# Patient Record
Sex: MALE | Race: BLACK OR AFRICAN AMERICAN | NOT HISPANIC OR LATINO | Employment: FULL TIME | URBAN - METROPOLITAN AREA
[De-identification: names, ages, dates, MRNs, and addresses within clinical notes are randomized per-mention and may not be internally consistent; named-entity substitution may affect disease eponyms.]

---

## 2017-01-18 ENCOUNTER — APPOINTMENT (EMERGENCY)
Dept: RADIOLOGY | Facility: HOSPITAL | Age: 47
End: 2017-01-18

## 2017-01-18 ENCOUNTER — APPOINTMENT (EMERGENCY)
Dept: CT IMAGING | Facility: HOSPITAL | Age: 47
End: 2017-01-18

## 2017-01-18 ENCOUNTER — HOSPITAL ENCOUNTER (EMERGENCY)
Facility: HOSPITAL | Age: 47
Discharge: HOME | End: 2017-01-18
Attending: EMERGENCY MEDICINE | Admitting: EMERGENCY MEDICINE

## 2017-01-18 VITALS
TEMPERATURE: 98.3 F | RESPIRATION RATE: 16 BRPM | HEART RATE: 48 BPM | SYSTOLIC BLOOD PRESSURE: 144 MMHG | WEIGHT: 265 LBS | DIASTOLIC BLOOD PRESSURE: 69 MMHG | OXYGEN SATURATION: 95 %

## 2017-01-18 DIAGNOSIS — I10 HYPERTENSION: Primary | ICD-10-CM

## 2017-01-18 DIAGNOSIS — R51.9 HEADACHE: ICD-10-CM

## 2017-01-18 LAB
AMPHETAMINES SERPL QL SCN: NEGATIVE
ANION GAP SERPL CALCULATED.3IONS-SCNC: 10 MMOL/L (ref 4–13)
ATRIAL RATE: 51 BPM
BACTERIA UR QL AUTO: ABNORMAL /HPF
BARBITURATES UR QL: NEGATIVE
BASOPHILS # BLD AUTO: 0.02 THOUSANDS/ΜL (ref 0–0.1)
BASOPHILS NFR BLD AUTO: 0 % (ref 0–1)
BENZODIAZ UR QL: NEGATIVE
BILIRUB UR QL STRIP: NEGATIVE
BUN SERPL-MCNC: 15 MG/DL (ref 5–25)
CALCIUM SERPL-MCNC: 9.1 MG/DL (ref 8.3–10.1)
CHLORIDE SERPL-SCNC: 98 MMOL/L (ref 100–108)
CLARITY UR: CLEAR
CO2 SERPL-SCNC: 31 MMOL/L (ref 21–32)
COCAINE UR QL: NEGATIVE
COLOR UR: YELLOW
COLOR, POC: YELLOW
CREAT SERPL-MCNC: 1.38 MG/DL (ref 0.6–1.3)
EOSINOPHIL # BLD AUTO: 0.42 THOUSAND/ΜL (ref 0–0.61)
EOSINOPHIL NFR BLD AUTO: 7 % (ref 0–6)
ERYTHROCYTE [DISTWIDTH] IN BLOOD BY AUTOMATED COUNT: 14.4 % (ref 11.6–15.1)
GFR SERPL CREATININE-BSD FRML MDRD: >60 ML/MIN/1.73SQ M
GLUCOSE SERPL-MCNC: 109 MG/DL (ref 65–140)
GLUCOSE UR STRIP-MCNC: NEGATIVE MG/DL
HCT VFR BLD AUTO: 41.2 % (ref 36.5–49.3)
HGB BLD-MCNC: 13.7 G/DL (ref 12–17)
HGB UR QL STRIP.AUTO: ABNORMAL
KETONES UR STRIP-MCNC: NEGATIVE MG/DL
LEUKOCYTE ESTERASE UR QL STRIP: NEGATIVE
LYMPHOCYTES # BLD AUTO: 1.9 THOUSANDS/ΜL (ref 0.6–4.47)
LYMPHOCYTES NFR BLD AUTO: 33 % (ref 14–44)
MCH RBC QN AUTO: 25.3 PG (ref 26.8–34.3)
MCHC RBC AUTO-ENTMCNC: 33.3 G/DL (ref 31.4–37.4)
MCV RBC AUTO: 76 FL (ref 82–98)
METHADONE UR QL: NEGATIVE
MONOCYTES # BLD AUTO: 0.59 THOUSAND/ΜL (ref 0.17–1.22)
MONOCYTES NFR BLD AUTO: 10 % (ref 4–12)
MUCOUS THREADS UR QL AUTO: ABNORMAL
NEUTROPHILS # BLD AUTO: 2.79 THOUSANDS/ΜL (ref 1.85–7.62)
NEUTS SEG NFR BLD AUTO: 50 % (ref 43–75)
NITRITE UR QL STRIP: NEGATIVE
NON-SQ EPI CELLS URNS QL MICRO: ABNORMAL /HPF
NRBC BLD AUTO-RTO: 0 /100 WBCS
NT-PROBNP SERPL-MCNC: 51 PG/ML
OPIATES UR QL SCN: NEGATIVE
P AXIS: 65 DEGREES
PCP UR QL: NEGATIVE
PH UR STRIP.AUTO: 7 [PH] (ref 4.5–8)
PLATELET # BLD AUTO: 175 THOUSANDS/UL (ref 149–390)
PMV BLD AUTO: 10.9 FL (ref 8.9–12.7)
POTASSIUM SERPL-SCNC: 3.1 MMOL/L (ref 3.5–5.3)
PR INTERVAL: 196 MS
PROT UR STRIP-MCNC: NEGATIVE MG/DL
QRS AXIS: 64 DEGREES
QRSD INTERVAL: 92 MS
QT INTERVAL: 448 MS
QTC INTERVAL: 412 MS
RBC # BLD AUTO: 5.41 MILLION/UL (ref 3.88–5.62)
RBC #/AREA URNS AUTO: ABNORMAL /HPF
SODIUM SERPL-SCNC: 139 MMOL/L (ref 136–145)
SP GR UR STRIP.AUTO: 1.02 (ref 1–1.03)
SPECIMEN SOURCE: NORMAL
T WAVE AXIS: 43 DEGREES
THC UR QL: NEGATIVE
TROPONIN I BLD-MCNC: 0.02 NG/ML (ref 0–0.08)
TSH SERPL DL<=0.05 MIU/L-ACNC: 2.14 UIU/ML (ref 0.36–3.74)
UROBILINOGEN UR QL STRIP.AUTO: 0.2 E.U./DL
VENTRICULAR RATE: 51 BPM
WBC # BLD AUTO: 5.72 THOUSAND/UL (ref 4.31–10.16)
WBC #/AREA URNS AUTO: ABNORMAL /HPF

## 2017-01-18 PROCEDURE — 99284 EMERGENCY DEPT VISIT MOD MDM: CPT

## 2017-01-18 PROCEDURE — 83880 ASSAY OF NATRIURETIC PEPTIDE: CPT | Performed by: EMERGENCY MEDICINE

## 2017-01-18 PROCEDURE — 84443 ASSAY THYROID STIM HORMONE: CPT | Performed by: EMERGENCY MEDICINE

## 2017-01-18 PROCEDURE — 36415 COLL VENOUS BLD VENIPUNCTURE: CPT | Performed by: EMERGENCY MEDICINE

## 2017-01-18 PROCEDURE — 85025 COMPLETE CBC W/AUTO DIFF WBC: CPT | Performed by: EMERGENCY MEDICINE

## 2017-01-18 PROCEDURE — 80048 BASIC METABOLIC PNL TOTAL CA: CPT | Performed by: EMERGENCY MEDICINE

## 2017-01-18 PROCEDURE — 96375 TX/PRO/DX INJ NEW DRUG ADDON: CPT

## 2017-01-18 PROCEDURE — 96374 THER/PROPH/DIAG INJ IV PUSH: CPT

## 2017-01-18 PROCEDURE — 71020 HB CHEST X-RAY 2VW FRONTAL&LATL: CPT

## 2017-01-18 PROCEDURE — 70450 CT HEAD/BRAIN W/O DYE: CPT

## 2017-01-18 PROCEDURE — 87086 URINE CULTURE/COLONY COUNT: CPT

## 2017-01-18 PROCEDURE — 80307 DRUG TEST PRSMV CHEM ANLYZR: CPT | Performed by: EMERGENCY MEDICINE

## 2017-01-18 PROCEDURE — 81001 URINALYSIS AUTO W/SCOPE: CPT

## 2017-01-18 PROCEDURE — 81002 URINALYSIS NONAUTO W/O SCOPE: CPT | Performed by: EMERGENCY MEDICINE

## 2017-01-18 PROCEDURE — 84484 ASSAY OF TROPONIN QUANT: CPT

## 2017-01-18 PROCEDURE — 93005 ELECTROCARDIOGRAM TRACING: CPT | Performed by: EMERGENCY MEDICINE

## 2017-01-18 RX ORDER — AMLODIPINE BESYLATE 10 MG/1
10 TABLET ORAL DAILY
COMMUNITY

## 2017-01-18 RX ORDER — METOCLOPRAMIDE HYDROCHLORIDE 5 MG/ML
10 INJECTION INTRAMUSCULAR; INTRAVENOUS ONCE
Status: COMPLETED | OUTPATIENT
Start: 2017-01-18 | End: 2017-01-18

## 2017-01-18 RX ORDER — ATENOLOL AND CHLORTHALIDONE TABLET 50; 25 MG/1; MG/1
1 TABLET ORAL DAILY
COMMUNITY
End: 2019-09-25 | Stop reason: HOSPADM

## 2017-01-18 RX ORDER — LISINOPRIL 40 MG/1
40 TABLET ORAL DAILY
COMMUNITY
End: 2018-06-08 | Stop reason: HOSPADM

## 2017-01-18 RX ORDER — KETOROLAC TROMETHAMINE 30 MG/ML
30 INJECTION, SOLUTION INTRAMUSCULAR; INTRAVENOUS ONCE
Status: COMPLETED | OUTPATIENT
Start: 2017-01-18 | End: 2017-01-18

## 2017-01-18 RX ORDER — DIPHENHYDRAMINE HYDROCHLORIDE 50 MG/ML
12.5 INJECTION INTRAMUSCULAR; INTRAVENOUS ONCE
Status: COMPLETED | OUTPATIENT
Start: 2017-01-18 | End: 2017-01-18

## 2017-01-18 RX ADMIN — DIPHENHYDRAMINE HYDROCHLORIDE 12.5 MG: 50 INJECTION, SOLUTION INTRAMUSCULAR; INTRAVENOUS at 03:08

## 2017-01-18 RX ADMIN — KETOROLAC TROMETHAMINE 30 MG: 30 INJECTION, SOLUTION INTRAMUSCULAR at 03:04

## 2017-01-18 RX ADMIN — METOCLOPRAMIDE 10 MG: 5 INJECTION, SOLUTION INTRAMUSCULAR; INTRAVENOUS at 03:10

## 2017-01-19 ENCOUNTER — HOSPITAL ENCOUNTER (EMERGENCY)
Facility: HOSPITAL | Age: 47
Discharge: HOME | End: 2017-01-19
Attending: EMERGENCY MEDICINE

## 2017-01-19 VITALS
TEMPERATURE: 98.4 F | SYSTOLIC BLOOD PRESSURE: 140 MMHG | DIASTOLIC BLOOD PRESSURE: 63 MMHG | RESPIRATION RATE: 16 BRPM | WEIGHT: 265 LBS | HEART RATE: 53 BPM | OXYGEN SATURATION: 97 %

## 2017-01-19 DIAGNOSIS — H53.149 PHOTOPHOBIA: ICD-10-CM

## 2017-01-19 DIAGNOSIS — R51.9 HEADACHE: Primary | ICD-10-CM

## 2017-01-19 LAB — BACTERIA UR CULT: NORMAL

## 2017-01-19 PROCEDURE — 99283 EMERGENCY DEPT VISIT LOW MDM: CPT

## 2017-01-19 PROCEDURE — 96361 HYDRATE IV INFUSION ADD-ON: CPT

## 2017-01-19 PROCEDURE — 96375 TX/PRO/DX INJ NEW DRUG ADDON: CPT

## 2017-01-19 PROCEDURE — 96374 THER/PROPH/DIAG INJ IV PUSH: CPT

## 2017-01-19 RX ORDER — DIPHENHYDRAMINE HYDROCHLORIDE 50 MG/ML
50 INJECTION INTRAMUSCULAR; INTRAVENOUS ONCE
Status: COMPLETED | OUTPATIENT
Start: 2017-01-19 | End: 2017-01-19

## 2017-01-19 RX ORDER — OLANZAPINE 10 MG/1
10 TABLET, ORALLY DISINTEGRATING ORAL ONCE
Status: COMPLETED | OUTPATIENT
Start: 2017-01-19 | End: 2017-01-19

## 2017-01-19 RX ORDER — METOCLOPRAMIDE HYDROCHLORIDE 5 MG/ML
10 INJECTION INTRAMUSCULAR; INTRAVENOUS ONCE
Status: COMPLETED | OUTPATIENT
Start: 2017-01-19 | End: 2017-01-19

## 2017-01-19 RX ORDER — BUTALBITAL, ACETAMINOPHEN AND CAFFEINE 50; 325; 40 MG/1; MG/1; MG/1
1 TABLET ORAL EVERY 4 HOURS PRN
Qty: 30 TABLET | Refills: 0 | Status: SHIPPED | OUTPATIENT
Start: 2017-01-19 | End: 2017-01-19 | Stop reason: CLARIF

## 2017-01-19 RX ORDER — IBUPROFEN 600 MG/1
600 TABLET ORAL EVERY 6 HOURS PRN
Qty: 30 TABLET | Refills: 0 | Status: SHIPPED | OUTPATIENT
Start: 2017-01-19 | End: 2019-09-25 | Stop reason: HOSPADM

## 2017-01-19 RX ORDER — KETOROLAC TROMETHAMINE 30 MG/ML
15 INJECTION, SOLUTION INTRAMUSCULAR; INTRAVENOUS ONCE
Status: COMPLETED | OUTPATIENT
Start: 2017-01-19 | End: 2017-01-19

## 2017-01-19 RX ADMIN — KETOROLAC TROMETHAMINE 15 MG: 30 INJECTION, SOLUTION INTRAMUSCULAR at 04:57

## 2017-01-19 RX ADMIN — DEXAMETHASONE SODIUM PHOSPHATE 10 MG: 10 INJECTION, SOLUTION INTRAMUSCULAR; INTRAVENOUS at 05:09

## 2017-01-19 RX ADMIN — SODIUM CHLORIDE 1000 ML: 0.9 INJECTION, SOLUTION INTRAVENOUS at 04:56

## 2017-01-19 RX ADMIN — DIPHENHYDRAMINE HYDROCHLORIDE 50 MG: 50 INJECTION, SOLUTION INTRAMUSCULAR; INTRAVENOUS at 04:59

## 2017-01-19 RX ADMIN — METOCLOPRAMIDE 10 MG: 5 INJECTION, SOLUTION INTRAMUSCULAR; INTRAVENOUS at 05:02

## 2017-01-19 RX ADMIN — OLANZAPINE 10 MG: 10 TABLET, ORALLY DISINTEGRATING ORAL at 05:09

## 2018-06-07 ENCOUNTER — HOSPITAL ENCOUNTER (INPATIENT)
Facility: HOSPITAL | Age: 48
LOS: 1 days | Discharge: HOME | DRG: 811 | End: 2018-06-08
Attending: EMERGENCY MEDICINE | Admitting: INTERNAL MEDICINE
Payer: COMMERCIAL

## 2018-06-07 DIAGNOSIS — T78.3XXA ANGIOEDEMA, INITIAL ENCOUNTER: Primary | ICD-10-CM

## 2018-06-07 PROBLEM — I10 HTN (HYPERTENSION): Status: ACTIVE | Noted: 2018-06-07

## 2018-06-07 PROCEDURE — 86161 COMPLEMENT/FUNCTION ACTIVITY: CPT | Performed by: INTERNAL MEDICINE

## 2018-06-07 PROCEDURE — 96375 TX/PRO/DX INJ NEW DRUG ADDON: CPT

## 2018-06-07 PROCEDURE — 96374 THER/PROPH/DIAG INJ IV PUSH: CPT

## 2018-06-07 PROCEDURE — 86332 IMMUNE COMPLEX ASSAY: CPT | Performed by: INTERNAL MEDICINE

## 2018-06-07 PROCEDURE — 99284 EMERGENCY DEPT VISIT MOD MDM: CPT

## 2018-06-07 PROCEDURE — 99222 1ST HOSP IP/OBS MODERATE 55: CPT | Performed by: INTERNAL MEDICINE

## 2018-06-07 PROCEDURE — 86160 COMPLEMENT ANTIGEN: CPT | Performed by: INTERNAL MEDICINE

## 2018-06-07 RX ORDER — ONDANSETRON 2 MG/ML
4 INJECTION INTRAMUSCULAR; INTRAVENOUS EVERY 6 HOURS PRN
Status: DISCONTINUED | OUTPATIENT
Start: 2018-06-07 | End: 2018-06-08 | Stop reason: HOSPADM

## 2018-06-07 RX ORDER — DIPHENHYDRAMINE HYDROCHLORIDE 50 MG/ML
25 INJECTION INTRAMUSCULAR; INTRAVENOUS EVERY 12 HOURS
Status: DISCONTINUED | OUTPATIENT
Start: 2018-06-07 | End: 2018-06-08 | Stop reason: HOSPADM

## 2018-06-07 RX ORDER — LISINOPRIL 40 MG/1
1 TABLET ORAL
COMMUNITY
End: 2018-06-08 | Stop reason: HOSPADM

## 2018-06-07 RX ORDER — POTASSIUM CHLORIDE 20 MEQ/1
40 TABLET, EXTENDED RELEASE ORAL
COMMUNITY
Start: 2015-10-02 | End: 2019-09-25 | Stop reason: HOSPADM

## 2018-06-07 RX ORDER — AMLODIPINE BESYLATE 10 MG/1
1 TABLET ORAL
COMMUNITY
End: 2018-06-08 | Stop reason: HOSPADM

## 2018-06-07 RX ORDER — AMLODIPINE BESYLATE 5 MG/1
10 TABLET ORAL DAILY
Status: DISCONTINUED | OUTPATIENT
Start: 2018-06-08 | End: 2018-06-08 | Stop reason: HOSPADM

## 2018-06-07 RX ORDER — METHYLPREDNISOLONE SODIUM SUCCINATE 125 MG/2ML
125 INJECTION, POWDER, LYOPHILIZED, FOR SOLUTION INTRAMUSCULAR; INTRAVENOUS ONCE
Status: COMPLETED | OUTPATIENT
Start: 2018-06-07 | End: 2018-06-07

## 2018-06-07 RX ORDER — DIPHENHYDRAMINE HYDROCHLORIDE 50 MG/ML
50 INJECTION INTRAMUSCULAR; INTRAVENOUS ONCE
Status: COMPLETED | OUTPATIENT
Start: 2018-06-07 | End: 2018-06-07

## 2018-06-07 RX ORDER — SPIRONOLACTONE 25 MG/1
25 TABLET ORAL DAILY
Status: DISCONTINUED | OUTPATIENT
Start: 2018-06-08 | End: 2018-06-08 | Stop reason: HOSPADM

## 2018-06-07 RX ORDER — SPIRONOLACTONE 25 MG/1
1 TABLET ORAL
COMMUNITY
End: 2019-09-25 | Stop reason: HOSPADM

## 2018-06-07 RX ORDER — ACETAMINOPHEN 325 MG/1
650 TABLET ORAL EVERY 6 HOURS PRN
Status: DISCONTINUED | OUTPATIENT
Start: 2018-06-07 | End: 2018-06-08 | Stop reason: HOSPADM

## 2018-06-07 RX ADMIN — FAMOTIDINE 20 MG: 10 INJECTION, SOLUTION INTRAVENOUS at 21:39

## 2018-06-07 RX ADMIN — DIPHENHYDRAMINE HYDROCHLORIDE 50 MG: 50 INJECTION, SOLUTION INTRAMUSCULAR; INTRAVENOUS at 07:39

## 2018-06-07 RX ADMIN — DIPHENHYDRAMINE HYDROCHLORIDE 25 MG: 50 INJECTION, SOLUTION INTRAMUSCULAR; INTRAVENOUS at 16:47

## 2018-06-07 RX ADMIN — METHYLPREDNISOLONE SODIUM SUCCINATE 125 MG: 125 INJECTION, POWDER, FOR SOLUTION INTRAMUSCULAR; INTRAVENOUS at 07:36

## 2018-06-07 RX ADMIN — FAMOTIDINE 20 MG: 10 INJECTION, SOLUTION INTRAVENOUS at 07:42

## 2018-06-07 NOTE — H&P
History and Physical - [facility] Internal Medicine    Patient Information: Melchor Loya 50 y o  male MRN: [id number]  Unit/Bed#: ED 14 Encounter: [id number]  Admitting Physician: Andrade Bernardo MD  PCP: Daniel Moise DO  Date of Admission:  06/07/18    Assessment/Plan:    Hospital Problem List:     Principal Problem:    Angioedema  Active Problems:    HTN (hypertension)      1 )  Angioedema  -patient states he started having lower lip swelling this morning a seems to be worsening  -received Solu-Medrol 125 mg IV in the ED, Pepcid and Benadryl without improvement  -likely secondary to is however however came secondary to her food and will rule out hereditary angioedema  -step-down monitoring  -will allow patient to eat as no tongue edema and no respiratory compromise  -will check C1 esterase inhibitor, C1q    2 ) HTN  -will discontinue ACEI  -c/w with norvasc, atenolol/chlorthalidone, spironolactone      VTE Prophylaxis: low risk for VTE  / sequential compression device   Code Status: FULL    POLST: There is no POLST form on file for this patient (pre-hospital)    Anticipated Length of Stay:  Patient will be admitted on an Inpatient basis with an anticipated length of stay of  Greater than 2 midnights  Justification for Hospital Stay: angioedema    Total Time for Visit, including Counseling / Coordination of Care: 30 minutes  Greater than 50% of this total time spent on direct patient counseling and coordination of care  Chief Complaint:   Lip swelling    History of Present Illness:    Melchor Loya is a 50 y o  male who presents with swelling of lower lip  Patient has past medical history of hypertension on ACE-inhibitor  Patient states that he had shrimp yesterday and he woke this morning with lower lip swelling  Patient states that gradually worsened  He denies any chest tightness, dyspnea, swelling of his tongue or itchiness  Patient denies any nausea, vomiting, diarrhea, fever, chills    Patient denies any chest pain, palpitations  In the ED patient received solumedrol 125mg IV, pepcid 20mg, benadryl 50mg IV  Patient will be admitted to step-down for close monitoring    Review of Systems:    Review of Systems   Constitutional: Negative  HENT:        Lip swelling     Eyes: Negative  Respiratory: Negative  Cardiovascular: Negative  Gastrointestinal: Negative  Endocrine: Negative  Genitourinary: Negative  Musculoskeletal: Negative  Skin: Negative  Allergic/Immunologic: Negative  Neurological: Negative  Hematological: Negative  Psychiatric/Behavioral: Negative  Past Medical and Surgical History:     Past Medical History:   Diagnosis Date    Hypertension        History reviewed  No pertinent surgical history  Meds/Allergies:    Prior to Admission medications    Medication Sig Start Date End Date Taking?  Authorizing Provider   amLODIPine (NORVASC) 10 mg tablet Take 1 tablet by mouth   Yes Historical Provider, MD   lisinopril (ZESTRIL) 40 mg tablet Take 1 tablet by mouth   Yes Historical Provider, MD   potassium chloride (K-DUR,KLOR-CON) 20 mEq tablet Take 40 mEq by mouth 10/2/15  Yes Historical Provider, MD   spironolactone (ALDACTONE) 25 mg tablet Take 1 tablet by mouth   Yes Historical Provider, MD   amLODIPine (NORVASC) 10 mg tablet Take 10 mg by mouth daily    Historical Provider, MD   atenolol-chlorthalidone (TENORETIC) 50-25 mg per tablet Take 1 tablet by mouth daily    Historical Provider, MD   ibuprofen (MOTRIN) 600 mg tablet Take 1 tablet by mouth every 6 (six) hours as needed for mild pain for up to 7 days 1/19/17 1/26/17  Dario Gonzalez MD   lisinopril (ZESTRIL) 40 mg tablet Take 40 mg by mouth daily    Historical Provider, MD   magnesium oxide (MAG-OX) 400 mg Take 1 tablet by mouth daily for 30 days 1/19/17 2/18/17  Dario Gonzalez MD   Riboflavin 400 MG CAPS Take 1 capsule by mouth daily for 30 days 1/19/17 2/18/17  Dario Gonzalez MD I have reviewed home medications with patient personally  Allergies: No Known Allergies    Social History:     History   Alcohol Use No     History   Smoking Status    Never Smoker   Smokeless Tobacco    Never Used     History   Drug Use No       Family History:    non-contributory    Physical Exam:     Vitals:   Blood Pressure: 130/90 (06/07/18 1327)  Pulse: 62 (06/07/18 1345)  Temperature: 97 9 °F (36 6 °C) (06/07/18 0703)  Temp Source: Oral (06/07/18 0703)  Respirations: 14 (06/07/18 1345)  Weight - Scale: 117 kg (257 lb 4 oz) (06/07/18 0703)  SpO2: 96 % (06/07/18 1345)    Constitutional: Patient is oriented to person, place and time, no acute distress  HEENT:  Normocephalic, atraumatic, lower lip swelling, no tongue swelling, uvula midline, EOMI, PERRLA, no scleral icterus, no pallor,   Neck:  Supple, no masses, no thyromegaly, no bruits Normal range of motion  Lymph nodes:  No lymphadenopathy  Cardiovascular: Normal S1S2, RRR, No murmurs/rubs/gallops appreciated  Pulmonary: Clear to auscultation bilaterally, No rhonchi/rales/wheezing appreciated  Abdominal: Soft, Bowel sounds present, Non-tender, Non-distended, No rebound/guarding, no hepatomegaly   Musculoskeletal: No tenderness/abnormality   Extremities:  No cyanosis, clubbing or edema  Peripheral pulses palpable and equal bilaterally  Neurological: Cranial nerves II-XII grossly intact, sensation intact, otherwise no focal neurological symptoms  Skin: Skin is warm and dry, no rashes  Additional Data:     Lab Results: I have personally reviewed pertinent reports  Invalid input(s): LABALBU        Imaging: I have personally reviewed pertinent reports  No results found  [employer] / Epic Records Reviewed: Yes     ** Please Note: This note has been constructed using a voice recognition system   **

## 2018-06-07 NOTE — ED NOTES
Pt resting comfortably  No complaints at this time  Denies trouble breathing or difficulty swallowing       Gonsalo Braga RN  06/07/18 9812

## 2018-06-07 NOTE — ED NOTES
Pt feels like his lip is swelling more  No acute change in VS, airway or respiratory function  Cardiac monitoring continued, call bell in reach and Dr Cindy Stein made aware       Scarlett Steen RN  06/07/18 2054

## 2018-06-07 NOTE — ED NOTES
Pt drinking water as per Dr Zhao Art  Pt tolerating P O challenge       Renetta Canas RN  06/07/18 7541

## 2018-06-07 NOTE — ED NOTES
Vital signs stable  Pt resting in NAD  Oral care and lip balm provided       Ivan Seals RN  06/07/18 9116

## 2018-06-07 NOTE — ED PROVIDER NOTES
History  Chief Complaint   Patient presents with    Lip Swelling     Woke up this am with swelling of the lower lip  51 yo male woke up this morning with swelling of the lower lip, without any prior similar incident  He denies any other sensation of swelling or pain  He denies chest tightness or shortness of breath  He was thinking it might have been shrimp he had last night, and he recalls he bit his lip while eating but he has never had an allergy  However, I noted he is also on lisinopril, which he has been on for "years "          History provided by:  Patient      Prior to Admission Medications   Prescriptions Last Dose Informant Patient Reported? Taking? Riboflavin 400 MG CAPS   No No   Sig: Take 1 capsule by mouth daily for 30 days   amLODIPine (NORVASC) 10 mg tablet   Yes No   Sig: Take 10 mg by mouth daily   amLODIPine (NORVASC) 10 mg tablet   Yes Yes   Sig: Take 1 tablet by mouth   atenolol-chlorthalidone (TENORETIC) 50-25 mg per tablet   Yes No   Sig: Take 1 tablet by mouth daily   ibuprofen (MOTRIN) 600 mg tablet   No No   Sig: Take 1 tablet by mouth every 6 (six) hours as needed for mild pain for up to 7 days   lisinopril (ZESTRIL) 40 mg tablet   Yes No   Sig: Take 40 mg by mouth daily   lisinopril (ZESTRIL) 40 mg tablet   Yes Yes   Sig: Take 1 tablet by mouth   magnesium oxide (MAG-OX) 400 mg   No No   Sig: Take 1 tablet by mouth daily for 30 days   potassium chloride (K-DUR,KLOR-CON) 20 mEq tablet   Yes Yes   Sig: Take 40 mEq by mouth   spironolactone (ALDACTONE) 25 mg tablet   Yes Yes   Sig: Take 1 tablet by mouth      Facility-Administered Medications: None       Past Medical History:   Diagnosis Date    Hypertension        History reviewed  No pertinent surgical history  History reviewed  No pertinent family history  I have reviewed and agree with the history as documented      Social History   Substance Use Topics    Smoking status: Never Smoker    Smokeless tobacco: Never Used  Alcohol use No        Review of Systems   Constitutional: Negative for appetite change, chills and fever  HENT: Positive for facial swelling (lower lip)  Negative for sore throat and trouble swallowing  Respiratory: Negative for cough, choking, chest tightness, shortness of breath and wheezing  Cardiovascular: Negative for chest pain and palpitations  Gastrointestinal: Negative for abdominal pain, diarrhea, nausea and vomiting  Genitourinary: Negative for dysuria and hematuria  Musculoskeletal: Negative for neck pain  Skin: Negative for rash  Neurological: Negative for dizziness, weakness and headaches  Psychiatric/Behavioral: Negative for suicidal ideas  All other systems reviewed and are negative  Physical Exam  Physical Exam   Constitutional: He is oriented to person, place, and time  He appears well-developed and well-nourished  Non-toxic appearance  HENT:   Head: Normocephalic  Right Ear: Tympanic membrane and external ear normal    Left Ear: External ear normal    Nose: Nose normal    Mouth/Throat: Oropharynx is clear and moist    Eyes: Conjunctivae, EOM and lids are normal  Pupils are equal, round, and reactive to light  Neck: Normal range of motion  Neck supple  No JVD present  No Brudzinski's sign and no Kernig's sign noted  Cardiovascular: Normal rate, regular rhythm and normal heart sounds  No murmur heard  Pulmonary/Chest: Effort normal and breath sounds normal  No accessory muscle usage  No tachypnea  No respiratory distress  He has no wheezes  Abdominal: Soft  Normal appearance and bowel sounds are normal  He exhibits no distension and no mass  There is no tenderness  There is no rigidity, no rebound and no guarding  Musculoskeletal: Normal range of motion  Lymphadenopathy:        Head (right side): No submental, no submandibular, no preauricular and no posterior auricular adenopathy present          Head (left side): No submental, no submandibular, no preauricular and no posterior auricular adenopathy present  He has no cervical adenopathy  Neurological: He is alert and oriented to person, place, and time  He has normal strength and normal reflexes  No cranial nerve deficit or sensory deficit  Coordination normal  GCS eye subscore is 4  GCS verbal subscore is 5  GCS motor subscore is 6  Skin: Skin is warm and dry  No rash noted  He is not diaphoretic  Psychiatric: He has a normal mood and affect  His speech is normal and behavior is normal  Thought content normal  Cognition and memory are normal    Nursing note and vitals reviewed        Vital Signs  ED Triage Vitals   Temperature Pulse Respirations Blood Pressure SpO2   06/07/18 0703 06/07/18 0703 06/07/18 0703 06/07/18 0703 06/07/18 0703   97 9 °F (36 6 °C) 56 16 147/87 98 %      Temp Source Heart Rate Source Patient Position - Orthostatic VS BP Location FiO2 (%)   06/07/18 0703 06/07/18 0815 06/07/18 0703 06/07/18 0703 --   Oral Monitor Sitting Right arm       Pain Score       06/07/18 0703       No Pain           Vitals:    06/07/18 1115 06/07/18 1327 06/07/18 1345 06/07/18 1547   BP: 119/77 130/90  145/90   Pulse: 56 62 62 62   Patient Position - Orthostatic VS:  Sitting  Lying       Visual Acuity      ED Medications  Medications   amLODIPine (NORVASC) tablet 10 mg (not administered)   atenolol-chlorthalidone (TENORETIC 50/25) combination (not administered)   spironolactone (ALDACTONE) tablet 25 mg (not administered)   ondansetron (ZOFRAN) injection 4 mg (not administered)   acetaminophen (TYLENOL) tablet 650 mg (not administered)   famotidine (PEPCID) injection 20 mg (not administered)   diphenhydrAMINE (BENADRYL) injection 25 mg (not administered)   methylPREDNISolone sodium succinate (Solu-MEDROL) injection 125 mg (125 mg Intravenous Given 6/7/18 0736)   famotidine (PEPCID) injection 20 mg (20 mg Intravenous Given 6/7/18 0742)   diphenhydrAMINE (BENADRYL) injection 50 mg (50 mg Intravenous Given 6/7/18 0739)       Diagnostic Studies  Results Reviewed     None                 No orders to display              Procedures  Procedures       Phone Contacts  ED Phone Contact    ED Course  ED Course as of Jun 07 1616   Thu Jun 07, 2018   1347 I have given almost 7 hours of dedicated observation, and there has been no improvement, and it is more likely he has actually progressed                                MDM  CritCare Time    Disposition  Final diagnoses:   Angioedema, initial encounter     Time reflects when diagnosis was documented in both MDM as applicable and the Disposition within this note     Time User Action Codes Description Comment    6/7/2018  1:54 PM Uli NINO Add [T78  3XXA] Angioedema, initial encounter       ED Disposition     ED Disposition Condition Comment    Admit  Case was discussed with Dr Nereida Nowak and the patient's admission status was agreed to be Admission Status: observation status to the service of Dr Nereida Nowak          Follow-up Information    None         Current Discharge Medication List      CONTINUE these medications which have NOT CHANGED    Details   !! amLODIPine (NORVASC) 10 mg tablet Take 1 tablet by mouth      !! lisinopril (ZESTRIL) 40 mg tablet Take 1 tablet by mouth      potassium chloride (K-DUR,KLOR-CON) 20 mEq tablet Take 40 mEq by mouth      spironolactone (ALDACTONE) 25 mg tablet Take 1 tablet by mouth      !! amLODIPine (NORVASC) 10 mg tablet Take 10 mg by mouth daily      atenolol-chlorthalidone (TENORETIC) 50-25 mg per tablet Take 1 tablet by mouth daily      ibuprofen (MOTRIN) 600 mg tablet Take 1 tablet by mouth every 6 (six) hours as needed for mild pain for up to 7 days  Qty: 30 tablet, Refills: 0      !! lisinopril (ZESTRIL) 40 mg tablet Take 40 mg by mouth daily      magnesium oxide (MAG-OX) 400 mg Take 1 tablet by mouth daily for 30 days  Qty: 30 tablet, Refills: 0      Riboflavin 400 MG CAPS Take 1 capsule by mouth daily for 30 days  Qty: 30 capsule, Refills: 0       !! - Potential duplicate medications found  Please discuss with provider  No discharge procedures on file      ED Provider  Electronically Signed by           Paulino Albarran MD  06/07/18 6857

## 2018-06-08 VITALS
SYSTOLIC BLOOD PRESSURE: 142 MMHG | WEIGHT: 257.25 LBS | HEART RATE: 66 BPM | HEIGHT: 75 IN | TEMPERATURE: 98 F | DIASTOLIC BLOOD PRESSURE: 84 MMHG | BODY MASS INDEX: 31.99 KG/M2 | RESPIRATION RATE: 23 BRPM | OXYGEN SATURATION: 96 %

## 2018-06-08 PROBLEM — T78.3XXA ANGIOEDEMA: Status: RESOLVED | Noted: 2018-06-07 | Resolved: 2018-06-08

## 2018-06-08 LAB
ALBUMIN SERPL BCP-MCNC: 3.4 G/DL (ref 3.5–5)
ALP SERPL-CCNC: 63 U/L (ref 46–116)
ALT SERPL W P-5'-P-CCNC: 24 U/L (ref 12–78)
ANION GAP SERPL CALCULATED.3IONS-SCNC: 9 MMOL/L (ref 4–13)
AST SERPL W P-5'-P-CCNC: 16 U/L (ref 5–45)
BILIRUB SERPL-MCNC: 0.41 MG/DL (ref 0.2–1)
BUN SERPL-MCNC: 23 MG/DL (ref 5–25)
CALCIUM SERPL-MCNC: 9.2 MG/DL (ref 8.3–10.1)
CHLORIDE SERPL-SCNC: 100 MMOL/L (ref 100–108)
CO2 SERPL-SCNC: 26 MMOL/L (ref 21–32)
CREAT SERPL-MCNC: 1.43 MG/DL (ref 0.6–1.3)
ERYTHROCYTE [DISTWIDTH] IN BLOOD BY AUTOMATED COUNT: 13.9 % (ref 11.6–15.1)
GFR SERPL CREATININE-BSD FRML MDRD: 66 ML/MIN/1.73SQ M
GLUCOSE SERPL-MCNC: 135 MG/DL (ref 65–140)
HCT VFR BLD AUTO: 40.7 % (ref 36.5–49.3)
HGB BLD-MCNC: 13.3 G/DL (ref 12–17)
MCH RBC QN AUTO: 25.3 PG (ref 26.8–34.3)
MCHC RBC AUTO-ENTMCNC: 32.7 G/DL (ref 31.4–37.4)
MCV RBC AUTO: 78 FL (ref 82–98)
PLATELET # BLD AUTO: 180 THOUSANDS/UL (ref 149–390)
PMV BLD AUTO: 10.6 FL (ref 8.9–12.7)
POTASSIUM SERPL-SCNC: 3.4 MMOL/L (ref 3.5–5.3)
PROT SERPL-MCNC: 7 G/DL (ref 6.4–8.2)
RBC # BLD AUTO: 5.25 MILLION/UL (ref 3.88–5.62)
SODIUM SERPL-SCNC: 135 MMOL/L (ref 136–145)
WBC # BLD AUTO: 14.82 THOUSAND/UL (ref 4.31–10.16)

## 2018-06-08 PROCEDURE — 80053 COMPREHEN METABOLIC PANEL: CPT | Performed by: INTERNAL MEDICINE

## 2018-06-08 PROCEDURE — 85027 COMPLETE CBC AUTOMATED: CPT | Performed by: INTERNAL MEDICINE

## 2018-06-08 PROCEDURE — 99239 HOSP IP/OBS DSCHRG MGMT >30: CPT | Performed by: INTERNAL MEDICINE

## 2018-06-08 RX ORDER — ATENOLOL 50 MG/1
50 TABLET ORAL DAILY
Status: DISCONTINUED | OUTPATIENT
Start: 2018-06-08 | End: 2018-06-08 | Stop reason: HOSPADM

## 2018-06-08 RX ORDER — CHLORTHALIDONE 25 MG/1
25 TABLET ORAL DAILY
Status: DISCONTINUED | OUTPATIENT
Start: 2018-06-08 | End: 2018-06-08 | Stop reason: HOSPADM

## 2018-06-08 RX ADMIN — FAMOTIDINE 20 MG: 10 INJECTION, SOLUTION INTRAVENOUS at 08:19

## 2018-06-08 RX ADMIN — AMLODIPINE BESYLATE 10 MG: 5 TABLET ORAL at 08:19

## 2018-06-08 RX ADMIN — DIPHENHYDRAMINE HYDROCHLORIDE 25 MG: 50 INJECTION, SOLUTION INTRAMUSCULAR; INTRAVENOUS at 04:27

## 2018-06-08 RX ADMIN — SPIRONOLACTONE 25 MG: 25 TABLET, FILM COATED ORAL at 08:19

## 2018-06-08 NOTE — DISCHARGE INSTRUCTIONS
Mr  Maricruz Square were admitted to [address] [place] [address] for swelling of your lips also called angioedema  We believe this may be from your lisinopril and/or shrimp/seafood that you ate  You were treated with steroids, pepcid, and benadryl  You have improved, your vitals signs remained stable  Your labs were okay with the exception of your kidney function-creatinine  This was slightly elevated, therefore I recommend you follow with your PCP and monitor your blood work  This could be elevated due to your long-standing High blood pressure  You are to completely avoid any type or lisinopril type medications and inform your PCP  You are to avoid seafood at this time and recommend outpatient food allergen testing  It was a pleasure to take care of you during your stay at our hospital  We [facility] you feel better      Sincerely,  Dr Deangelo Knutson

## 2018-06-08 NOTE — CASE MANAGEMENT
Initial Clinical Review    Admission: Date/Time/Statement: 6/7/18 @ 1449     Orders Placed This Encounter   Procedures    Inpatient Admission (expected length of stay for this patient is greater than two midnights)     Standing Status:   Standing     Number of Occurrences:   1     Order Specific Question:   Admitting Physician     Answer:   Yamini Weinstein     Order Specific Question:   Level of Care     Answer:   Level 1 Stepdown [13]     Order Specific Question:   Estimated length of stay     Answer:   More than 2 Midnights     Order Specific Question:   Certification     Answer:   I certify that inpatient services are medically necessary for this patient for a duration of greater than two midnights  See H&P and MD Progress Notes for additional information about the patient's course of treatment  ED: Date/Time/Mode of Arrival:   ED Arrival Information     Expected Arrival Acuity Means of Arrival Escorted By Service Admission Type    - 6/7/2018 06:58 Urgent Walk-In Self General Medicine Urgent    Arrival Complaint    Swollen Lip          Chief Complaint:   Chief Complaint   Patient presents with    Lip Swelling     Woke up this am with swelling of the lower lip  History of Illness: Tadeo Ellison is a 50 y o  male who presents with swelling of lower lip  Patient has past medical history of hypertension on ACE-inhibitor  Patient states that he had shrimp yesterday and he woke this morning with lower lip swelling  Patient states that gradually worsened  He denies any chest tightness, dyspnea, swelling of his tongue or itchiness  Patient denies any nausea, vomiting, diarrhea, fever, chills  Patient denies any chest pain, palpitations      In the ED patient received solumedrol 125mg IV, pepcid 20mg, benadryl 50mg IV    Patient will be admitted to step-down for close monitoring       ED Vital Signs:   ED Triage Vitals   Temperature Pulse Respirations Blood Pressure SpO2   06/07/18 0703 06/07/18 0703 06/07/18 0703 06/07/18 0703 06/07/18 0703   97 9 °F (36 6 °C) 56 16 147/87 98 %      Temp Source Heart Rate Source Patient Position - Orthostatic VS BP Location FiO2 (%)   06/07/18 0703 06/07/18 0815 06/07/18 0703 06/07/18 0703 --   Oral Monitor Sitting Right arm       Pain Score       06/07/18 0703       No Pain        Wt Readings from Last 1 Encounters:   06/07/18 117 kg (257 lb 4 oz)       Vital Signs (abnormal): above    Abnormal Labs/Diagnostic Test Results:      WBC   14 82  Albumin   3 4  Creat   1 43  NA  135  K  3 4    ED Treatment:   Medication Administration from 06/07/2018 0658 to 06/07/2018 1601       Date/Time Order Dose Route Action Action by Comments     06/07/2018 0736 methylPREDNISolone sodium succinate (Solu-MEDROL) injection 125 mg 125 mg Intravenous Given Manoj Delgado RN      06/07/2018 0742 famotidine (PEPCID) injection 20 mg 20 mg Intravenous Given Manoj Delgado RN      06/07/2018 0739 diphenhydrAMINE (BENADRYL) injection 50 mg 50 mg Intravenous Given Manoj Delgado RN           Past Medical/Surgical History: Active Ambulatory Problems     Diagnosis Date Noted    No Active Ambulatory Problems     Resolved Ambulatory Problems     Diagnosis Date Noted    No Resolved Ambulatory Problems     Past Medical History:   Diagnosis Date    Hypertension        Admitting Diagnosis: Swollen lip [R22 0]  Angioedema, initial encounter [T78  3XXA]    Age/Sex: 50 y o  male    Assessment/Plan:  )  Angioedema  -patient states he started having lower lip swelling this morning a seems to be worsening  -received Solu-Medrol 125 mg IV in the ED, Pepcid and Benadryl without improvement  -likely secondary to is however however came secondary to her food and will rule out hereditary angioedema  -step-down monitoring  -will allow patient to eat as no tongue edema and no respiratory compromise  -will check C1 esterase inhibitor, C1q     2 ) HTN  -will discontinue ACEI  -c/w with norvasc, atenolol/chlorthalidone, spironolactone        VTE Prophylaxis: low risk for VTE  / sequential compression device   Code Status: FULL     POLST: There is no POLST form on file for this patient (pre-hospital)     Anticipated Length of Stay:  Patient will be admitted on an Inpatient basis with an anticipated length of stay of  Greater than 2 midnights  Justification for Hospital Stay: angioedema       Admission Orders:   IP   6/7  @   1449  Scheduled Meds:   Current Facility-Administered Medications:  acetaminophen 650 mg Oral Q6H PRN Raquel Jc MD   amLODIPine 10 mg Oral Daily Raquel Jc MD   atenolol-chlorthalidone (TENORETIC 50/25) combination  Oral Daily Raquel Jc MD   diphenhydrAMINE 25 mg Intravenous Q12H Raquel Jc MD   famotidine 20 mg Intravenous Q12H [facility] Raquel Jc MD   ondansetron 4 mg Intravenous Q6H PRN Raquel Jc MD   spironolactone 25 mg Oral Daily Raquel Jc MD     Continuous Infusions:    PRN Meds:   acetaminophen    ondansetron     Tele  Cardiac  Diet    Thank you,  [facility] in the [facility] by [employer] for 2017  Network Utilization Review Department  Phone: [phone]; Fax [phone]  ATTENTION: The Network Utilization Review Department is now centralized for our 7 Facilities  Make a note that we have a new phone and fax numbers for our Department  Please call with any questions or concerns to [phone] and carefully follow the prompts so that you are directed to the right person  All voicemails are confidential  Fax any determinations, approvals, denials, and requests for initial or continue stay review clinical to [phone]  Due to HIGH CALL volume, it would be easier if you could please send faxed requests to expedite your requests and in part, help us provide discharge notifications faster

## 2018-06-08 NOTE — DISCHARGE SUMMARY
Transition of Care Discharge Summary - [facility] Internal Medicine    Patient Information: Mihir Cordon 50 y o  male MRN: [id number]  Unit/Bed#: ICU 08 Encounter: [id number]    Discharging Physician / Practitioner: Pina Kaplan MD  PCP: Saeed Valdez DO  Admission Date: 6/7/2018  Discharge Date: 06/08/18    Disposition:      Other: home    For Discharges to [facility]:   · Not Applicable to this Patient - Not Applicable to this Patient    Reason for Admission: lip swelling    Discharge Diagnoses:     Principal Problem (Resolved): Angioedema  Active Problems:    HTN (hypertension)      Consultations During Hospital Stay:  · none    Medication Adjustments and Discharge Medications:  · Medication Dosing Tapers - Please refer to Discharge Medication List for details on any medication dosing tapers (if applicable to patient)  · Discharge Medication List: See after visit summary for reconciled discharge medications  Wound Care Recommendations:  When applicable, please see wound care section of After Visit Summary  Diet Recommendations at Discharge:  Diet -        Diet Orders            Start     Ordered    06/07/18 1609  Diet Cardiac; Cardiac TLC 2 3 GM NA; Cardiac Step 1  Diet effective now     Question Answer Comment   Diet Type Cardiac    Cardiac Cardiac TLC 2 3 GM NA    Other Restriction(s): Cardiac Step 1    RD to adjust diet per protocol? Yes        06/07/18 1608        Fluid Restriction - No Fluid Restriction at Discharge  Hospital Course:     Mihir Cordon is a 50 y o  male patient who originally presented to the hospital on 6/7/2018 due to swelling of lower lip  Patient has past medical history of hypertension on ACE-inhibitor  Patient states that he had shrimp yesterday and he woke this morning with lower lip swelling  Patient states that gradually worsened  He denies any chest tightness, dyspnea, swelling of his tongue or itchiness    Patient denies any nausea, vomiting, diarrhea, fever, chills  Patient denies any chest pain, palpitations  In ED patient was treated with Solu-Medrol 1 g IV, Pepcid 20 mg and Benadryl 50 mg I V  Patient was admitted to step-down for close monitoring  Patient was continued on Pepcid and Benadryl  Upon exam since the patient has significantly improved back to his baseline  No complaints  Patient advised on avoiding and discontinuing lisinopril and avoiding seafood at this point as possible etiology  Patient advised to follow PCP outpatient  Condition at Discharge: good     Discharge Day Visit / Exam:     Subjective:  Patient seen examined at bedside, significantly improved currently denies any complaints  Vitals: Blood Pressure: 142/84 (06/08/18 0720)  Pulse: 66 (06/08/18 0720)  Temperature: 98 °F (36 7 °C) (06/08/18 0720)  Temp Source: Temporal (06/08/18 0720)  Respirations: (!) 23 (06/08/18 0720)  Height: 6' 3" (190 5 cm) (06/07/18 1604)  Weight - Scale: 117 kg (257 lb 4 oz) (06/07/18 0703)  SpO2: 96 % (06/08/18 0720)    Physical Exam:    Constitutional: Patient is oriented to person, place and time, no acute distress  HEENT:  Normocephalic, atraumatic, EOMI, PERRLA, no scleral icterus, no pallor, moist oral mucosa  Neck:  Supple, no masses, no thyromegaly, no bruits Normal range of motion  Lymph nodes:  No lymphadenopathy  Cardiovascular: Normal S1S2, RRR, No murmurs/rubs/gallops appreciated  Pulmonary: Clear to auscultation bilaterally, No rhonchi/rales/wheezing appreciated  Abdominal: Soft, Bowel sounds present, Non-tender, Non-distended, No rebound/guarding, no hepatomegaly   Musculoskeletal: No tenderness/abnormality   Extremities:  No cyanosis, clubbing or edema  Peripheral pulses palpable and equal bilaterally  Neurological: Cranial nerves II-XII grossly intact, sensation intact, otherwise no focal neurological symptoms  Skin: Skin is warm and dry, no rashes      Discharge instructions/Information to patient and family:   See after visit summary section titled Discharge Instructions for information provided to patient and family  Planned Readmission: no     Discharge Statement:  I spent 30 minutes discharging the patient  This time was spent on the day of discharge  I had direct contact with the patient on the day of discharge  Greater than 50% of the total time was spent examining patient, answering all patient questions, arranging and discussing plan of care with patient as well as directly providing post-discharge instructions  Additional time then spent on discharge activities      ** Please Note: This note has been constructed using a voice recognition system **

## 2018-06-11 LAB
C1INH ACT/NOR SERPL: 102 %MEAN NORMAL
C1INH SERPL-MCNC: 40 MG/DL (ref 21–39)

## 2018-06-12 LAB — IC SERPL C1Q BIND-ACNC: <1.2 UG EQ/ML

## 2019-09-23 ENCOUNTER — HOSPITAL ENCOUNTER (INPATIENT)
Facility: HOSPITAL | Age: 49
LOS: 2 days | Discharge: HOME | DRG: 552 | End: 2019-09-25
Attending: EMERGENCY MEDICINE | Admitting: HOSPITALIST

## 2019-09-23 ENCOUNTER — APPOINTMENT (EMERGENCY)
Dept: CT IMAGING | Facility: HOSPITAL | Age: 49
DRG: 552 | End: 2019-09-23

## 2019-09-23 DIAGNOSIS — I10 HYPERTENSION: ICD-10-CM

## 2019-09-23 DIAGNOSIS — M54.59 INTRACTABLE LOW BACK PAIN: Primary | ICD-10-CM

## 2019-09-23 DIAGNOSIS — R11.2 NAUSEA AND VOMITING: ICD-10-CM

## 2019-09-23 DIAGNOSIS — K76.9 LIVER LESION: ICD-10-CM

## 2019-09-23 DIAGNOSIS — E87.6 HYPOKALEMIA: ICD-10-CM

## 2019-09-23 DIAGNOSIS — I16.0 HYPERTENSIVE URGENCY: ICD-10-CM

## 2019-09-23 DIAGNOSIS — M48.061 FORAMINAL STENOSIS OF LUMBAR REGION: ICD-10-CM

## 2019-09-23 PROBLEM — M54.50 ACUTE LOW BACK PAIN: Status: ACTIVE | Noted: 2019-09-23

## 2019-09-23 LAB
ALBUMIN SERPL BCP-MCNC: 3.9 G/DL (ref 3.5–5)
ALP SERPL-CCNC: 96 U/L (ref 46–116)
ALT SERPL W P-5'-P-CCNC: 36 U/L (ref 12–78)
ANION GAP SERPL CALCULATED.3IONS-SCNC: 8 MMOL/L (ref 4–13)
AST SERPL W P-5'-P-CCNC: 26 U/L (ref 5–45)
ATRIAL RATE: 66 BPM
BACTERIA UR QL AUTO: ABNORMAL /HPF
BASOPHILS # BLD AUTO: 0.02 THOUSANDS/ΜL (ref 0–0.1)
BASOPHILS NFR BLD AUTO: 0 % (ref 0–1)
BILIRUB SERPL-MCNC: 0.45 MG/DL (ref 0.2–1)
BILIRUB UR QL STRIP: NEGATIVE
BUN SERPL-MCNC: 7 MG/DL (ref 5–25)
CALCIUM SERPL-MCNC: 9.1 MG/DL (ref 8.3–10.1)
CHLORIDE SERPL-SCNC: 102 MMOL/L (ref 100–108)
CLARITY UR: ABNORMAL
CO2 SERPL-SCNC: 33 MMOL/L (ref 21–32)
COLOR UR: YELLOW
CREAT SERPL-MCNC: 0.98 MG/DL (ref 0.6–1.3)
EOSINOPHIL # BLD AUTO: 0.24 THOUSAND/ΜL (ref 0–0.61)
EOSINOPHIL NFR BLD AUTO: 4 % (ref 0–6)
ERYTHROCYTE [DISTWIDTH] IN BLOOD BY AUTOMATED COUNT: 14.4 % (ref 11.6–15.1)
GFR SERPL CREATININE-BSD FRML MDRD: 103 ML/MIN/1.73SQ M
GLUCOSE SERPL-MCNC: 108 MG/DL (ref 65–140)
GLUCOSE UR STRIP-MCNC: NEGATIVE MG/DL
HCT VFR BLD AUTO: 45.9 % (ref 36.5–49.3)
HGB BLD-MCNC: 14.1 G/DL (ref 12–17)
HGB UR QL STRIP.AUTO: ABNORMAL
IMM GRANULOCYTES # BLD AUTO: 0.01 THOUSAND/UL (ref 0–0.2)
IMM GRANULOCYTES NFR BLD AUTO: 0 % (ref 0–2)
KETONES UR STRIP-MCNC: NEGATIVE MG/DL
LEUKOCYTE ESTERASE UR QL STRIP: NEGATIVE
LIPASE SERPL-CCNC: 99 U/L (ref 73–393)
LYMPHOCYTES # BLD AUTO: 1.36 THOUSANDS/ΜL (ref 0.6–4.47)
LYMPHOCYTES NFR BLD AUTO: 21 % (ref 14–44)
MCH RBC QN AUTO: 24.3 PG (ref 26.8–34.3)
MCHC RBC AUTO-ENTMCNC: 30.7 G/DL (ref 31.4–37.4)
MCV RBC AUTO: 79 FL (ref 82–98)
MONOCYTES # BLD AUTO: 0.5 THOUSAND/ΜL (ref 0.17–1.22)
MONOCYTES NFR BLD AUTO: 8 % (ref 4–12)
NEUTROPHILS # BLD AUTO: 4.52 THOUSANDS/ΜL (ref 1.85–7.62)
NEUTS SEG NFR BLD AUTO: 67 % (ref 43–75)
NITRITE UR QL STRIP: NEGATIVE
NON-SQ EPI CELLS URNS QL MICRO: ABNORMAL /HPF
NRBC BLD AUTO-RTO: 0 /100 WBCS
P AXIS: 69 DEGREES
PH UR STRIP.AUTO: 8 [PH]
PLATELET # BLD AUTO: 201 THOUSANDS/UL (ref 149–390)
PMV BLD AUTO: 12.6 FL (ref 8.9–12.7)
POTASSIUM SERPL-SCNC: 2.9 MMOL/L (ref 3.5–5.3)
PR INTERVAL: 180 MS
PROT SERPL-MCNC: 8 G/DL (ref 6.4–8.2)
PROT UR STRIP-MCNC: ABNORMAL MG/DL
QRS AXIS: 68 DEGREES
QRSD INTERVAL: 90 MS
QT INTERVAL: 404 MS
QTC INTERVAL: 423 MS
RBC # BLD AUTO: 5.81 MILLION/UL (ref 3.88–5.62)
RBC #/AREA URNS AUTO: ABNORMAL /HPF
SODIUM SERPL-SCNC: 143 MMOL/L (ref 136–145)
SP GR UR STRIP.AUTO: 1.02 (ref 1–1.03)
T WAVE AXIS: 56 DEGREES
UROBILINOGEN UR QL STRIP.AUTO: 0.2 E.U./DL
VENTRICULAR RATE: 66 BPM
WBC # BLD AUTO: 6.65 THOUSAND/UL (ref 4.31–10.16)
WBC #/AREA URNS AUTO: ABNORMAL /HPF

## 2019-09-23 PROCEDURE — 74176 CT ABD & PELVIS W/O CONTRAST: CPT

## 2019-09-23 PROCEDURE — 85025 COMPLETE CBC W/AUTO DIFF WBC: CPT | Performed by: PHYSICIAN ASSISTANT

## 2019-09-23 PROCEDURE — 99285 EMERGENCY DEPT VISIT HI MDM: CPT | Performed by: PHYSICIAN ASSISTANT

## 2019-09-23 PROCEDURE — 93005 ELECTROCARDIOGRAM TRACING: CPT

## 2019-09-23 PROCEDURE — 99285 EMERGENCY DEPT VISIT HI MDM: CPT

## 2019-09-23 PROCEDURE — 36415 COLL VENOUS BLD VENIPUNCTURE: CPT | Performed by: PHYSICIAN ASSISTANT

## 2019-09-23 PROCEDURE — 96361 HYDRATE IV INFUSION ADD-ON: CPT

## 2019-09-23 PROCEDURE — 96372 THER/PROPH/DIAG INJ SC/IM: CPT

## 2019-09-23 PROCEDURE — 83690 ASSAY OF LIPASE: CPT | Performed by: PHYSICIAN ASSISTANT

## 2019-09-23 PROCEDURE — 74174 CTA ABD&PLVS W/CONTRAST: CPT

## 2019-09-23 PROCEDURE — 93010 ELECTROCARDIOGRAM REPORT: CPT

## 2019-09-23 PROCEDURE — 96375 TX/PRO/DX INJ NEW DRUG ADDON: CPT

## 2019-09-23 PROCEDURE — 99223 1ST HOSP IP/OBS HIGH 75: CPT | Performed by: HOSPITALIST

## 2019-09-23 PROCEDURE — 71275 CT ANGIOGRAPHY CHEST: CPT

## 2019-09-23 PROCEDURE — 80053 COMPREHEN METABOLIC PANEL: CPT | Performed by: PHYSICIAN ASSISTANT

## 2019-09-23 PROCEDURE — 96376 TX/PRO/DX INJ SAME DRUG ADON: CPT

## 2019-09-23 PROCEDURE — 96374 THER/PROPH/DIAG INJ IV PUSH: CPT

## 2019-09-23 PROCEDURE — 81001 URINALYSIS AUTO W/SCOPE: CPT | Performed by: PHYSICIAN ASSISTANT

## 2019-09-23 RX ORDER — CLONIDINE 0.1 MG/24H
0.1 PATCH, EXTENDED RELEASE TRANSDERMAL WEEKLY
Status: DISCONTINUED | OUTPATIENT
Start: 2019-09-23 | End: 2019-09-24

## 2019-09-23 RX ORDER — ONDANSETRON 2 MG/ML
4 INJECTION INTRAMUSCULAR; INTRAVENOUS ONCE
Status: COMPLETED | OUTPATIENT
Start: 2019-09-23 | End: 2019-09-23

## 2019-09-23 RX ORDER — DIAZEPAM 5 MG/ML
2.5 INJECTION, SOLUTION INTRAMUSCULAR; INTRAVENOUS ONCE
Status: COMPLETED | OUTPATIENT
Start: 2019-09-23 | End: 2019-09-23

## 2019-09-23 RX ORDER — OXYCODONE HYDROCHLORIDE 5 MG/1
5 TABLET ORAL EVERY 4 HOURS PRN
Status: DISCONTINUED | OUTPATIENT
Start: 2019-09-23 | End: 2019-09-25 | Stop reason: HOSPADM

## 2019-09-23 RX ORDER — HYDROMORPHONE HCL/PF 1 MG/ML
0.5 SYRINGE (ML) INJECTION EVERY 4 HOURS PRN
Status: DISCONTINUED | OUTPATIENT
Start: 2019-09-23 | End: 2019-09-25 | Stop reason: HOSPADM

## 2019-09-23 RX ORDER — HYDRALAZINE HYDROCHLORIDE 20 MG/ML
10 INJECTION INTRAMUSCULAR; INTRAVENOUS EVERY 6 HOURS PRN
Status: DISCONTINUED | OUTPATIENT
Start: 2019-09-23 | End: 2019-09-24

## 2019-09-23 RX ORDER — MORPHINE SULFATE 4 MG/ML
4 INJECTION, SOLUTION INTRAMUSCULAR; INTRAVENOUS ONCE
Status: COMPLETED | OUTPATIENT
Start: 2019-09-23 | End: 2019-09-23

## 2019-09-23 RX ORDER — HYDROCHLOROTHIAZIDE 25 MG/1
25 TABLET ORAL DAILY
COMMUNITY

## 2019-09-23 RX ORDER — AMLODIPINE BESYLATE 10 MG/1
10 TABLET ORAL ONCE
Status: COMPLETED | OUTPATIENT
Start: 2019-09-23 | End: 2019-09-23

## 2019-09-23 RX ORDER — POTASSIUM CHLORIDE 14.9 MG/ML
20 INJECTION INTRAVENOUS ONCE
Status: COMPLETED | OUTPATIENT
Start: 2019-09-23 | End: 2019-09-24

## 2019-09-23 RX ORDER — KETOROLAC TROMETHAMINE 30 MG/ML
15 INJECTION, SOLUTION INTRAMUSCULAR; INTRAVENOUS ONCE
Status: COMPLETED | OUTPATIENT
Start: 2019-09-23 | End: 2019-09-23

## 2019-09-23 RX ORDER — ONDANSETRON 2 MG/ML
4 INJECTION INTRAMUSCULAR; INTRAVENOUS EVERY 4 HOURS PRN
Status: DISCONTINUED | OUTPATIENT
Start: 2019-09-23 | End: 2019-09-25 | Stop reason: HOSPADM

## 2019-09-23 RX ORDER — HYDROCHLOROTHIAZIDE 25 MG/1
25 TABLET ORAL DAILY
Status: DISCONTINUED | OUTPATIENT
Start: 2019-09-23 | End: 2019-09-25 | Stop reason: HOSPADM

## 2019-09-23 RX ORDER — ACETAMINOPHEN 325 MG/1
650 TABLET ORAL EVERY 6 HOURS PRN
Status: DISCONTINUED | OUTPATIENT
Start: 2019-09-23 | End: 2019-09-25 | Stop reason: HOSPADM

## 2019-09-23 RX ORDER — TRAMADOL HYDROCHLORIDE 50 MG/1
50 TABLET ORAL EVERY 6 HOURS PRN
Status: DISCONTINUED | OUTPATIENT
Start: 2019-09-23 | End: 2019-09-25 | Stop reason: HOSPADM

## 2019-09-23 RX ORDER — LIDOCAINE 50 MG/G
1 PATCH TOPICAL DAILY
Status: DISCONTINUED | OUTPATIENT
Start: 2019-09-23 | End: 2019-09-25 | Stop reason: HOSPADM

## 2019-09-23 RX ADMIN — KETOROLAC TROMETHAMINE 15 MG: 30 INJECTION, SOLUTION INTRAMUSCULAR at 13:35

## 2019-09-23 RX ADMIN — AMLODIPINE BESYLATE 10 MG: 10 TABLET ORAL at 14:00

## 2019-09-23 RX ADMIN — HYDROCHLOROTHIAZIDE 25 MG: 25 TABLET ORAL at 14:00

## 2019-09-23 RX ADMIN — KETOROLAC TROMETHAMINE 15 MG: 30 INJECTION, SOLUTION INTRAMUSCULAR at 13:00

## 2019-09-23 RX ADMIN — CLONIDINE 0.1 MG: 0.1 PATCH TRANSDERMAL at 19:43

## 2019-09-23 RX ADMIN — HYDRALAZINE HYDROCHLORIDE 10 MG: 20 INJECTION INTRAMUSCULAR; INTRAVENOUS at 23:37

## 2019-09-23 RX ADMIN — IOHEXOL 100 ML: 350 INJECTION, SOLUTION INTRAVENOUS at 16:03

## 2019-09-23 RX ADMIN — ONDANSETRON 4 MG: 2 INJECTION INTRAMUSCULAR; INTRAVENOUS at 13:00

## 2019-09-23 RX ADMIN — OXYCODONE HYDROCHLORIDE 5 MG: 5 TABLET ORAL at 23:37

## 2019-09-23 RX ADMIN — SODIUM CHLORIDE 500 ML: 0.9 INJECTION, SOLUTION INTRAVENOUS at 17:55

## 2019-09-23 RX ADMIN — DIAZEPAM 2.5 MG: 5 INJECTION, SOLUTION INTRAMUSCULAR; INTRAVENOUS at 16:42

## 2019-09-23 RX ADMIN — HYDROMORPHONE HYDROCHLORIDE 0.5 MG: 1 INJECTION, SOLUTION INTRAMUSCULAR; INTRAVENOUS; SUBCUTANEOUS at 19:42

## 2019-09-23 RX ADMIN — SODIUM CHLORIDE 1000 ML: 0.9 INJECTION, SOLUTION INTRAVENOUS at 13:04

## 2019-09-23 RX ADMIN — ONDANSETRON 4 MG: 2 INJECTION INTRAMUSCULAR; INTRAVENOUS at 13:31

## 2019-09-23 RX ADMIN — LIDOCAINE 1 PATCH: 50 PATCH CUTANEOUS at 21:15

## 2019-09-23 RX ADMIN — MORPHINE SULFATE 4 MG: 4 INJECTION INTRAVENOUS at 17:38

## 2019-09-23 RX ADMIN — POTASSIUM CHLORIDE 20 MEQ: 14.9 INJECTION, SOLUTION INTRAVENOUS at 17:49

## 2019-09-23 NOTE — ED PROVIDER NOTES
History  Chief Complaint   Patient presents with    Flank Pain     Patient reporting R sided flank pain which started last night  Patient also reporting n/v  Denies hematuria  Denies hx of kidney stones  Patient is a 70-year-old male with past medical history of hypertension who presents with right-sided flank pain, nausea, vomiting for 2 days  Patient states that yesterday he noted sudden onset severe right flank pain that does not radiate  He states he tried massaging the area and taking it easy, the symptoms improved slightly, however this morning again he noted severe right flank pain and had 2 episodes of nonbloody nonbilious vomiting with nausea  He states he attempted to take Motrin at approximately 9:00 a m  This morning, but vomited within 10 minutes  He states nothing makes the pain any better or worse  She denies any previous similar symptoms, history of kidney stones, dysuria, hematuria, urinary frequency or urgency, diarrhea, saddle anesthesia, loss of bowel or bladder function, numbness, tingling, weakness in the legs  He states he otherwise feels well and denies any fevers, chills, diaphoresis, headaches, vision changes, neck pain or stiffness, congestion, cough, shortness of breath, chest pain, abdominal pain, or rash  Patient states he was unable to take his morning BP medication because of the N/V  Prior to Admission Medications   Prescriptions Last Dose Informant Patient Reported? Taking?    Riboflavin 400 MG CAPS   No No   Sig: Take 1 capsule by mouth daily for 30 days   amLODIPine (NORVASC) 10 mg tablet 9/23/2019 at Unknown time  Yes Yes   Sig: Take 10 mg by mouth daily   atenolol-chlorthalidone (TENORETIC) 50-25 mg per tablet Not Taking at Unknown time  Yes No   Sig: Take 1 tablet by mouth daily   hydrochlorothiazide (HYDRODIURIL) 25 mg tablet 9/23/2019 at Unknown time  Yes Yes   Sig: Take 25 mg by mouth daily   ibuprofen (MOTRIN) 600 mg tablet   No No   Sig: Take 1 tablet by mouth every 6 (six) hours as needed for mild pain for up to 7 days   magnesium oxide (MAG-OX) 400 mg   No No   Sig: Take 1 tablet by mouth daily for 30 days   potassium chloride (K-DUR,KLOR-CON) 20 mEq tablet Not Taking at Unknown time  Yes No   Sig: Take 40 mEq by mouth   spironolactone (ALDACTONE) 25 mg tablet Not Taking at Unknown time  Yes No   Sig: Take 1 tablet by mouth      Facility-Administered Medications: None       Past Medical History:   Diagnosis Date    Hypertension        History reviewed  No pertinent surgical history  History reviewed  No pertinent family history  I have reviewed and agree with the history as documented  Social History     Tobacco Use    Smoking status: Never Smoker    Smokeless tobacco: Never Used   Substance Use Topics    Alcohol use: No    Drug use: No        Review of Systems   Constitutional: Negative for chills, diaphoresis and fever  HENT: Negative for congestion, ear pain, rhinorrhea and sore throat  Eyes: Negative for visual disturbance  Respiratory: Negative for cough, shortness of breath, wheezing and stridor  Cardiovascular: Negative for chest pain, palpitations and leg swelling  Gastrointestinal: Positive for nausea and vomiting  Negative for abdominal pain and diarrhea  Genitourinary: Positive for flank pain  Negative for difficulty urinating, discharge, dysuria, frequency, hematuria, penile pain, penile swelling, scrotal swelling, testicular pain and urgency  Musculoskeletal: Negative for myalgias, neck pain and neck stiffness  Skin: Negative for color change, pallor and rash  Neurological: Negative for dizziness, weakness, light-headedness, numbness and headaches  All other systems reviewed and are negative  Physical Exam  Physical Exam   Constitutional: He is oriented to person, place, and time  He appears well-developed and well-nourished  He is active and cooperative  Non-toxic appearance   He does not have a sickly appearance  He does not appear ill  He appears distressed  Patient is rolling around ED bed clutching right flank, not diaphoretic, non-toxic appearing  HENT:   Head: Normocephalic and atraumatic  Right Ear: Hearing, tympanic membrane, external ear and ear canal normal    Left Ear: Hearing, tympanic membrane, external ear and ear canal normal    Nose: Nose normal    Mouth/Throat: Oropharynx is clear and moist    Eyes: Pupils are equal, round, and reactive to light  Conjunctivae and EOM are normal    Neck: Normal range of motion  Neck supple  Cardiovascular: Normal rate, regular rhythm, S1 normal, S2 normal, normal heart sounds, intact distal pulses and normal pulses  Pulses:       Radial pulses are 2+ on the right side, and 2+ on the left side  Posterior tibial pulses are 2+ on the right side, and 2+ on the left side  Pulmonary/Chest: Effort normal and breath sounds normal  No stridor  No respiratory distress  He has no wheezes  Abdominal: Soft  Normal appearance and bowel sounds are normal  He exhibits no distension  There is no tenderness  There is CVA tenderness (right, minimal pain)  There is no rigidity, no rebound and no guarding  Musculoskeletal: Normal range of motion  Right hip: Normal         Left hip: Normal         Cervical back: Normal         Thoracic back: Normal         Lumbar back: Normal         Back:    Patient with trace pain along CVA, and notes pain as indicated on imaging, but not tender to palpation, no swelling, erythema, skin changes, decreased range of motion of the right hip or pain on palpation of the hip  Negative straight leg raise  Neurovascularly intact, 5/5 strength in bilateral lower extremities  Neurological: He is alert and oriented to person, place, and time  He has normal strength  No sensory deficit  GCS eye subscore is 4  GCS verbal subscore is 5  GCS motor subscore is 6  Skin: Skin is warm and dry   Capillary refill takes less than 2 seconds  He is not diaphoretic  Nursing note and vitals reviewed        Vital Signs  ED Triage Vitals   Temperature Pulse Respirations Blood Pressure SpO2   09/23/19 1144 09/23/19 1145 09/23/19 1145 09/23/19 1145 09/23/19 1145   98 5 °F (36 9 °C) 59 18 (!) 197/100 97 %      Temp Source Heart Rate Source Patient Position - Orthostatic VS BP Location FiO2 (%)   09/23/19 1144 09/23/19 1145 09/23/19 1145 09/23/19 1145 --   Temporal Monitor Sitting Right arm       Pain Score       09/23/19 1145       Worst Possible Pain           Vitals:    09/24/19 2235 09/25/19 0738 09/25/19 1302 09/25/19 1500   BP: 140/76 157/84 157/80 133/72   Pulse: 66 61  76   Patient Position - Orthostatic VS: Lying Sitting Lying Sitting         Visual Acuity      ED Medications  Medications   ondansetron (ZOFRAN) injection 4 mg (4 mg Intravenous Given 9/23/19 1300)   ketorolac (TORADOL) injection 15 mg (15 mg Intravenous Given 9/23/19 1300)   sodium chloride 0 9 % bolus 1,000 mL (0 mL Intravenous Stopped 9/23/19 1523)   ondansetron (ZOFRAN) injection 4 mg (4 mg Intravenous Given 9/23/19 1331)   ketorolac (TORADOL) injection 15 mg (15 mg Intravenous Given 9/23/19 1335)   amLODIPine (NORVASC) tablet 10 mg (10 mg Oral Given 9/23/19 1400)   iohexol (OMNIPAQUE) 350 MG/ML injection (MULTI-DOSE) 100 mL (100 mL Intravenous Given 9/23/19 1603)   diazepam (VALIUM) injection 2 5 mg (2 5 mg Intramuscular Given 9/23/19 1642)   morphine (PF) 4 mg/mL injection 4 mg (4 mg Intravenous Given 9/23/19 1738)   potassium chloride 20 mEq IVPB (premix) (0 mEq Intravenous Stopped 9/24/19 1557)   sodium chloride 0 9 % bolus 500 mL (0 mL Intravenous Stopped 9/24/19 1557)   potassium chloride (K-DUR,KLOR-CON) CR tablet 40 mEq (40 mEq Oral Given 9/25/19 0907)       Diagnostic Studies  Results Reviewed     Procedure Component Value Units Date/Time    Basic metabolic panel [[id number]]  (Abnormal) Collected:  09/24/19 0456    Lab Status:  Final result Specimen:  Blood from Arm, Left Updated:  09/24/19 0958     Sodium 140 mmol/L      Potassium 2 7 mmol/L      Chloride 101 mmol/L      CO2 28 mmol/L      ANION GAP 11 mmol/L      BUN 9 mg/dL      Creatinine 1 08 mg/dL      Glucose 108 mg/dL      Calcium 8 9 mg/dL      eGFR 92 ml/min/1 73sq m     Narrative:       [facility] guidelines for Chronic Kidney Disease (CKD):     Stage 1 with normal or high GFR (GFR > 90 mL/min/1 73 square meters)    Stage 2 Mild CKD (GFR = 60-89 mL/min/1 73 square meters)    Stage 3A Moderate CKD (GFR = 45-59 mL/min/1 73 square meters)    Stage 3B Moderate CKD (GFR = 30-44 mL/min/1 73 square meters)    Stage 4 Severe CKD (GFR = 15-29 mL/min/1 73 square meters)    Stage 5 End Stage CKD (GFR <15 mL/min/1 73 square meters)  Note: GFR calculation is accurate only with a steady state creatinine    Magnesium [[id number]]  (Normal) Collected:  09/24/19 0455    Lab Status:  Final result Specimen:  Blood from Arm, Left Updated:  09/24/19 0651     Magnesium 1 7 mg/dL     CBC and differential [[id number]]  (Abnormal) Collected:  09/24/19 0455    Lab Status:  Final result Specimen:  Blood from Arm, Left Updated:  09/24/19 0602     WBC 6 14 Thousand/uL      RBC 5 56 Million/uL      Hemoglobin 13 6 g/dL      Hematocrit 42 7 %      MCV 77 fL      MCH 24 5 pg      MCHC 31 9 g/dL      RDW 14 3 %      MPV 11 2 fL      Platelets 611 Thousands/uL      nRBC 0 /100 WBCs      Neutrophils Relative 69 %      Immat GRANS % 0 %      Lymphocytes Relative 18 %      Monocytes Relative 11 %      Eosinophils Relative 1 %      Basophils Relative 1 %      Neutrophils Absolute 4 29 Thousands/µL      Immature Grans Absolute 0 01 Thousand/uL      Lymphocytes Absolute 1 11 Thousands/µL      Monocytes Absolute 0 67 Thousand/µL      Eosinophils Absolute 0 03 Thousand/µL      Basophils Absolute 0 03 Thousands/µL     Urine Microscopic [[id number]]  (Abnormal) Collected:  09/23/19 1252    Lab Status:  Final result Specimen: Urine, Clean Catch Updated:  09/23/19 1331     RBC, UA None Seen /hpf      WBC, UA 2-4 /hpf      Epithelial Cells Occasional /hpf      Bacteria, UA None Seen /hpf     Comprehensive metabolic panel [[id number]]  (Abnormal) Collected:  09/23/19 1259    Lab Status:  Final result Specimen:  Blood from Arm, Left Updated:  09/23/19 1324     Sodium 143 mmol/L      Potassium 2 9 mmol/L      Chloride 102 mmol/L      CO2 33 mmol/L      ANION GAP 8 mmol/L      BUN 7 mg/dL      Creatinine 0 98 mg/dL      Glucose 108 mg/dL      Calcium 9 1 mg/dL      AST 26 U/L      ALT 36 U/L      Alkaline Phosphatase 96 U/L      Total Protein 8 0 g/dL      Albumin 3 9 g/dL      Total Bilirubin 0 45 mg/dL      eGFR 103 ml/min/1 73sq m     Narrative:       [employer] guidelines for Chronic Kidney Disease (CKD):     Stage 1 with normal or high GFR (GFR > 90 mL/min/1 73 square meters)    Stage 2 Mild CKD (GFR = 60-89 mL/min/1 73 square meters)    Stage 3A Moderate CKD (GFR = 45-59 mL/min/1 73 square meters)    Stage 3B Moderate CKD (GFR = 30-44 mL/min/1 73 square meters)    Stage 4 Severe CKD (GFR = 15-29 mL/min/1 73 square meters)    Stage 5 End Stage CKD (GFR <15 mL/min/1 73 square meters)  Note: GFR calculation is accurate only with a steady state creatinine    Lipase [[id number]]  (Normal) Collected:  09/23/19 1259    Lab Status:  Final result Specimen:  Blood from Arm, Left Updated:  09/23/19 1324     Lipase 99 u/L     CBC and differential [[id number]]  (Abnormal) Collected:  09/23/19 1259    Lab Status:  Final result Specimen:  Blood from Arm, Left Updated:  09/23/19 1310     WBC 6 65 Thousand/uL      RBC 5 81 Million/uL      Hemoglobin 14 1 g/dL      Hematocrit 45 9 %      MCV 79 fL      MCH 24 3 pg      MCHC 30 7 g/dL      RDW 14 4 %      MPV 12 6 fL      Platelets 788 Thousands/uL      nRBC 0 /100 WBCs      Neutrophils Relative 67 %      Immat GRANS % 0 %      Lymphocytes Relative 21 %      Monocytes Relative 8 %      Eosinophils Relative 4 %      Basophils Relative 0 %      Neutrophils Absolute 4 52 Thousands/µL      Immature Grans Absolute 0 01 Thousand/uL      Lymphocytes Absolute 1 36 Thousands/µL      Monocytes Absolute 0 50 Thousand/µL      Eosinophils Absolute 0 24 Thousand/µL      Basophils Absolute 0 02 Thousands/µL     UA w Reflex to Microscopic w Reflex to Culture [[id number]]  (Abnormal) Collected:  09/23/19 1252    Lab Status:  Final result Specimen:  Urine, Clean Catch Updated:  09/23/19 1305     Color, UA Yellow     Clarity, UA Slightly Cloudy     Specific [place], UA 1 020     pH, UA 8 0     Leukocytes, UA Negative     Nitrite, UA Negative     Protein, UA Trace mg/dl      Glucose, UA Negative mg/dl      Ketones, UA Negative mg/dl      Urobilinogen, UA 0 2 E U /dl      Bilirubin, UA Negative     Blood, UA Small                 MRI lumbar spine wo contrast   Final Result by Faye Arreguin MD (09/25 2077)      Multilevel degenerative changes of the lumbar spine, as described above  Severe left foraminal narrowing with impingement of the exiting left L5 nerve root is present at L5-S1  Left foraminal/extra foraminal disc protrusion with annular fissure is also noted at L3-L4 with near abutment of the extraforaminal portion of the left L3 nerve root  Workstation performed: [id number]         CTA dissection protocol chest abdomen pelvis w wo contrast   Final Result by Faye Arreguin MD (09/23 7246)      No evidence for aortic dissection  Redemonstration of a hypodensity within the upper pole the left kidney with adjacent stranding is identified on noncontrast CT performed earlier the same day  Differential diagnosis should include an area of focal bacterial pyelonephritis versus    possibly a cyst with rupture  Correlate with urinalysis  Indeterminate enhancing lesion within the right hepatic lobe, as described above    Correlation with nonemergent contrast-enhanced MRI of the abdomen utilizing liver protocol is recommended for further evaluation  Workstation performed: [id number]         CT renal stone study abdomen pelvis without contrast   Final Result by Quentin Bowers MD (09/23 1321)      No urinary tract calculus  No hydronephrosis  Nonspecific inflammatory type stranding in the focal anterolateral left perinephric fat, of uncertain etiology  In the appropriate clinical setting, this can be seen in patients with history of unilateral left pyelonephritis  Other etiologies for this    indeterminate finding are not excluded  If no clinical explanation for this finding, which is of questionable clinical significance, can be found, consider nonemergent follow-up noncontrast and contrast-enhanced renal protocol CT to exclude the unlikely    possibility that there is a left renal mass  Workstation performed: [id number]         MRI abdomen w wo contrast    (Results Pending)              Procedures  ECG 12 Lead Documentation Only  Date/Time: 9/23/2019 2:28 PM  Performed by: Ian Wright PA-C  Authorized by: Ian Wright PA-C     Indications / Diagnosis:  Hypokalemia  ECG reviewed by me, the ED Provider: yes    Patient location:  ED  Rate:     ECG rate:  66    ECG rate assessment: normal    Rhythm:     Rhythm: sinus rhythm    Ectopy:     Ectopy: PVCs      PVCs:  Infrequent  QRS:     QRS axis:  Normal    QRS intervals:  Normal  Conduction:     Conduction: normal    ST segments:     ST segments:  Normal  T waves:     T waves: non-specific             ED Course  ED Course as of Oct 04 1011   Mon Sep 23, 2019   1328 Will get EKG and replete   Potassium(!): 2 9   1428 EKG with NSR with one PVC, [id number], no T wave changes, no ST segment changes      1434 Spoke with Dr Violeta Barnes about patient  Concern since patient continues with pain and is hypertensive   Will give oral HTN medications and reassess      1076 Patient still with consistent pain and HTN, will do CTA dissection study 1647 Impression      No evidence for aortic dissection  Redemonstration of a hypodensity within the upper pole the left kidney with adjacent stranding is identified on noncontrast CT performed earlier the same day   Differential diagnosis should include an area of focal bacterial pyelonephritis versus   possibly a cyst with rupture   Correlate with urinalysis  Indeterminate enhancing lesion within the right hepatic lobe, as described above   Correlation with nonemergent contrast-enhanced MRI of the abdomen utilizing liver protocol is recommended for further evaluation  CTA dissection protocol chest abdomen pelvis w wo contrast   1712 Patient still notes nausea and 10/10 back pain  Will admit for intractable pain, HTN, hypokalemia and nausea  [id number] with SLIM, reviewed case and ED course   Dr Pollo Sands who will admit                                  MDM  Number of Diagnoses or Management Options  Hypertension:   Hypokalemia:   Intractable low back pain:   Nausea and vomiting:       Disposition  Final diagnoses:   Intractable low back pain   Hypokalemia   Hypertension   Nausea and vomiting     Time reflects when diagnosis was documented in both MDM as applicable and the Disposition within this note     Time User Action Codes Description Comment    9/23/2019  5:32 PM Celeste Parsons Add [M54 5] Intractable low back pain     9/23/2019  5:32 PM Juan Dumont Add [E87 6] Hypokalemia     9/23/2019  5:32 PM Celeste Parsons Add [I10] Hypertension     9/23/2019  5:32 PM Celeste Parsons Add [R11 2] Nausea and vomiting     9/24/2019 10:00 AM Hamilton Herzog Add [I16 0] Hypertensive urgency     9/25/2019  1:19 PM Lori PABON Add [K76 9] Liver lesion     9/25/2019  1:19 PM Jessica Corona Add [M99 83] Foraminal stenosis of lumbar region       ED Disposition     ED Disposition Condition Date/Time Comment    Admit Stable Mon Sep 23, 2019  5:31 PM Case was discussed with Dr Pollo Sands and the patient's admission status was agreed to be Admission Status: inpatient status to the service of Dr Roberto Escobedo           Follow-up Information     Follow up With Specialties Details Why Joann7 DO [facility] Schedule an appointment as soon as possible for a visit in 1 week(s)  [address]  [phone]            Discharge Medication List as of 9/25/2019  4:28 PM      START taking these medications    Details   lidocaine (LIDODERM) 5 % Apply 1 patch topically daily Remove & Discard patch within 12 hours or as directed by MD, Starting Thu 9/26/2019, Normal      NIFEdipine (ADALAT CC) 60 MG 24 hr tablet Take 1 tablet (60 mg total) by mouth daily, Starting Thu 9/26/2019, Print      tiZANidine (ZANAFLEX) 4 mg tablet Take 1 tablet (4 mg total) by mouth every 8 (eight) hours as needed for muscle spasms, Starting Wed 9/25/2019, Print      oxyCODONE (ROXICODONE) 5 mg immediate release tablet Take 1 tablet (5 mg total) by mouth every 4 (four) hours as needed for moderate pain or severe pain for up to 10 daysMax Daily Amount: 30 mg, Starting Wed 9/25/2019, Until Sat 10/5/2019, Print         CONTINUE these medications which have CHANGED    Details   spironolactone (ALDACTONE) 50 mg tablet Take 1 tablet (50 mg total) by mouth 2 (two) times a day, Starting Wed 9/25/2019, Print         CONTINUE these medications which have NOT CHANGED    Details   amLODIPine (NORVASC) 10 mg tablet Take 10 mg by mouth daily, Until Discontinued, Historical Med      hydrochlorothiazide (HYDRODIURIL) 25 mg tablet Take 25 mg by mouth daily, Historical Med      magnesium oxide (MAG-OX) 400 mg Take 1 tablet by mouth daily for 30 days, Starting 1/19/2017, Until Sat 2/18/17, Print      Riboflavin 400 MG CAPS Take 1 capsule by mouth daily for 30 days, Starting 1/19/2017, Until Sat 2/18/17, Print         STOP taking these medications       atenolol-chlorthalidone (TENORETIC) 50-25 mg per tablet Comments: Reason for Stopping:         ibuprofen (MOTRIN) 600 mg tablet Comments:   Reason for Stopping:         potassium chloride (K-DUR,KLOR-CON) 20 mEq tablet Comments:   Reason for Stopping:             Outpatient Discharge Orders   MRI abdomen w wo contrast   Standing Status: Future Standing Exp   Date: 03/25/20       ED Provider  Electronically Signed by           Coleman Allen PA-C  10/04/19 101

## 2019-09-23 NOTE — H&P
H&P- Cora Marrufo 1970, 46 y o  male MRN: [id number]    Unit/Bed#: ED 29 Encounter: [id number]    Primary Care Provider: Michaela London DO   Date and time admitted to hospital: 9/23/2019 11:47 AM        * Acute low back pain  Assessment & Plan  Presented with acute low back/flank pain  It is more in his right flank than over his spine  Woke up with it this am  No known trauma  He did not feel or hear any pops or tearing in his low back  Nothing acute seen on CT abdomen  Including kidney stone search  ? If this is a herniated disc with referred pain  With the hypokalemia, I also wonder about something going on in his adrenal gland    Will check MRI of the lumbar spine  Ask ortho, PT and OT to see        Chief Complaint:   Woke up with acute right flank pain      History of Present Illness:    Cora Marrufo is a 46 y o  male who presents with woke up with acute right flank pain  No urinary problems  no blood seen in urine  He doesn't remember any trauma to his back  He has never had this problem before  He doesn't remember any tearing sensation nor "popping" sounds in his back  No acute findiiings were found on CT abdomena  UA was negative  IV morpine has not helped his pain  He says that it is still 10/10  No loss of bowel or bladder control  No leg weakness         Review of Systems:    Review of Systems   Constitutional: Negative  HENT: Negative  Eyes: Negative  Respiratory: Negative  Cardiovascular: Negative  Gastrointestinal: Negative  Endocrine: Negative  Genitourinary: Negative  Musculoskeletal:        Acute low back pain today     All other systems reviewed and are negative  Past Medical and Surgical History:     Past Medical History:   Diagnosis Date    Hypertension        History reviewed  No pertinent surgical history  Home Medications:    Prior to Admission medications    Medication Sig Start Date End Date Taking?  Authorizing Provider   hydrochlorothiazide (HYDRODIURIL) 25 mg tablet Take 25 mg by mouth daily   Yes Historical Provider, MD   amLODIPine (NORVASC) 10 mg tablet Take 10 mg by mouth daily    Historical Provider, MD   atenolol-chlorthalidone (TENORETIC) 50-25 mg per tablet Take 1 tablet by mouth daily    Historical Provider, MD   ibuprofen (MOTRIN) 600 mg tablet Take 1 tablet by mouth every 6 (six) hours as needed for mild pain for up to 7 days 1/19/17 1/26/17  Carrington Hendrickson MD   magnesium oxide (MAG-OX) 400 mg Take 1 tablet by mouth daily for 30 days 1/19/17 2/18/17  Carrington Hendrickson MD   potassium chloride (K-DUR,KLOR-CON) 20 mEq tablet Take 40 mEq by mouth 10/2/15   Historical Provider, MD   Riboflavin 400 MG CAPS Take 1 capsule by mouth daily for 30 days 1/19/17 2/18/17  Carrington Hendrickson MD   spironolactone (ALDACTONE) 25 mg tablet Take 1 tablet by mouth    Historical Provider, MD     I have reviewed home medications with patient personally  Allergies: Allergies   Allergen Reactions    Lisinopril Angioedema         Social History:    Substance Use History:   Social History     Substance and Sexual Activity   Alcohol Use No     Social History     Tobacco Use   Smoking Status Never Smoker   Smokeless Tobacco Never Used     Social History     Substance and Sexual Activity   Drug Use No         Family History:    non-contributory      Physical Exam:     Vitals:   Blood Pressure: (!) 197/90 (09/23/19 1700)  Pulse: 74 (09/23/19 1700)  Temperature: 98 4 °F (36 9 °C) (09/23/19 1145)  Temp Source: Temporal (09/23/19 1145)  Respirations: 20 (09/23/19 1700)  Weight - Scale: 112 kg (245 lb 13 oz) (09/23/19 1143)  SpO2: 99 % (09/23/19 1700)    Physical Exam   HENT:   Head: Normocephalic and atraumatic  Eyes: Pupils are equal, round, and reactive to light  EOM are normal    Cardiovascular: Normal rate and regular rhythm  Exam reveals no gallop and no friction rub  No murmur heard    Pulmonary/Chest: Effort normal and breath sounds normal  He has no wheezes  He has no rales  Abdominal: Soft  Bowel sounds are normal  There is no tenderness  Musculoskeletal: He exhibits no edema  Back:   He is tender very laterally in the right flank  It is just above his right PSIS  It does feel very ropey, like a muscle spasm  No CVA tenderness   Nursing note and vitals reviewed       Additional Data:     Lab Results: I have personally reviewed pertinent reports  Results from last 7 days   Lab Units 09/23/19  1259   WBC Thousand/uL 6 65   HEMOGLOBIN g/dL 14 1   HEMATOCRIT % 45 9   PLATELETS Thousands/uL 201   NEUTROS PCT % 67   LYMPHS PCT % 21   MONOS PCT % 8   EOS PCT % 4     Results from last 7 days   Lab Units 09/23/19  1259   POTASSIUM mmol/L 2 9*   CHLORIDE mmol/L 102   CO2 mmol/L 33*   BUN mg/dL 7   CREATININE mg/dL 0 98   CALCIUM mg/dL 9 1   ALK PHOS U/L 96   ALT U/L 36   AST U/L 26                     Imaging: I have personally reviewed pertinent reports  CTA dissection protocol chest abdomen pelvis w wo contrast   Final Result by Myrna Tiwari MD (09/23 0045)      No evidence for aortic dissection  Redemonstration of a hypodensity within the upper pole the left kidney with adjacent stranding is identified on noncontrast CT performed earlier the same day  Differential diagnosis should include an area of focal bacterial pyelonephritis versus    possibly a cyst with rupture  Correlate with urinalysis  Indeterminate enhancing lesion within the right hepatic lobe, as described above  Correlation with nonemergent contrast-enhanced MRI of the abdomen utilizing liver protocol is recommended for further evaluation  Workstation performed: [id number]         CT renal stone study abdomen pelvis without contrast   Final Result by Rashad Caban MD (09/23 1324)      No urinary tract calculus  No hydronephrosis        Nonspecific inflammatory type stranding in the focal anterolateral left perinephric fat, of uncertain etiology  In the appropriate clinical setting, this can be seen in patients with history of unilateral left pyelonephritis  Other etiologies for this    indeterminate finding are not excluded  If no clinical explanation for this finding, which is of questionable clinical significance, can be found, consider nonemergent follow-up noncontrast and contrast-enhanced renal protocol CT to exclude the unlikely    possibility that there is a left renal mass  Workstation performed: [id number]         MRI inpatient order    (Results Pending)         ·       VTE Prophylaxis: ambulating        Anticipated Length of Stay:  Patient will be admitted on an Inpatient basis with an anticipated length of stay of  Greater than 2 midnights  Justification for Hospital Stay: he is having 10/10 acute back pain requiring IV narcotics  We do not have a source for his pain  He will need workup, MRI lumbar spine, orthopedic consult  His length of stay will be greater than 2 midnights  Total Time for Visit, including Counseling / Coordination of Care: 45 minutes spent    Greater than 50% of this total time spent on direct patient counseling and coordination of care  ** Please Note: This note has been constructed using a voice recognition system   **

## 2019-09-23 NOTE — ED NOTES
Admitting doctor aware of Pt's blood pressure, ok to go to the floor     Gabriel Moran RN  45/97/61 [id number]

## 2019-09-23 NOTE — ASSESSMENT & PLAN NOTE
Presented with acute low back/flank pain  It is more in his right flank than over his spine  Woke up with it this am  No known trauma  He did not feel or hear any pops or tearing in his low back  Nothing acute seen on CT abdomen  Including kidney stone search      ? If this is a herniated disc with referred pain  With the hypokalemia, I also wonder about something going on in his adrenal gland    Will check MRI of the lumbar spine  Ask ortho, PT and OT to see

## 2019-09-23 NOTE — LETTER
[address]  Dept: [phone]    September 26, 2019     Patient: Kali Harrington   YOB: 1970   Date of Visit: 9/23/2019       To Whom it May Concern:    Kali Harrington is under my professional care  He was seen in the hospital from 9/23/2019   to 09/26/19  He may return to work on September 30, 2019 with the following limitations 25 pound lifting restriction until cleared by his family doctor  If you have any questions or concerns, please don't hesitate to call           Sincerely,          McCulloch Kinga, DO

## 2019-09-24 LAB
ANION GAP SERPL CALCULATED.3IONS-SCNC: 11 MMOL/L (ref 4–13)
BASOPHILS # BLD AUTO: 0.03 THOUSANDS/ΜL (ref 0–0.1)
BASOPHILS NFR BLD AUTO: 1 % (ref 0–1)
BUN SERPL-MCNC: 9 MG/DL (ref 5–25)
CALCIUM SERPL-MCNC: 8.9 MG/DL (ref 8.3–10.1)
CHLORIDE SERPL-SCNC: 101 MMOL/L (ref 100–108)
CO2 SERPL-SCNC: 28 MMOL/L (ref 21–32)
CREAT SERPL-MCNC: 1.08 MG/DL (ref 0.6–1.3)
EOSINOPHIL # BLD AUTO: 0.03 THOUSAND/ΜL (ref 0–0.61)
EOSINOPHIL NFR BLD AUTO: 1 % (ref 0–6)
ERYTHROCYTE [DISTWIDTH] IN BLOOD BY AUTOMATED COUNT: 14.3 % (ref 11.6–15.1)
GFR SERPL CREATININE-BSD FRML MDRD: 92 ML/MIN/1.73SQ M
GLUCOSE SERPL-MCNC: 108 MG/DL (ref 65–140)
HCT VFR BLD AUTO: 42.7 % (ref 36.5–49.3)
HGB BLD-MCNC: 13.6 G/DL (ref 12–17)
IMM GRANULOCYTES # BLD AUTO: 0.01 THOUSAND/UL (ref 0–0.2)
IMM GRANULOCYTES NFR BLD AUTO: 0 % (ref 0–2)
LYMPHOCYTES # BLD AUTO: 1.11 THOUSANDS/ΜL (ref 0.6–4.47)
LYMPHOCYTES NFR BLD AUTO: 18 % (ref 14–44)
MAGNESIUM SERPL-MCNC: 1.7 MG/DL (ref 1.6–2.6)
MCH RBC QN AUTO: 24.5 PG (ref 26.8–34.3)
MCHC RBC AUTO-ENTMCNC: 31.9 G/DL (ref 31.4–37.4)
MCV RBC AUTO: 77 FL (ref 82–98)
MONOCYTES # BLD AUTO: 0.67 THOUSAND/ΜL (ref 0.17–1.22)
MONOCYTES NFR BLD AUTO: 11 % (ref 4–12)
NEUTROPHILS # BLD AUTO: 4.29 THOUSANDS/ΜL (ref 1.85–7.62)
NEUTS SEG NFR BLD AUTO: 69 % (ref 43–75)
NRBC BLD AUTO-RTO: 0 /100 WBCS
PLATELET # BLD AUTO: 195 THOUSANDS/UL (ref 149–390)
PMV BLD AUTO: 11.2 FL (ref 8.9–12.7)
POTASSIUM SERPL-SCNC: 2.7 MMOL/L (ref 3.5–5.3)
RBC # BLD AUTO: 5.56 MILLION/UL (ref 3.88–5.62)
SODIUM SERPL-SCNC: 140 MMOL/L (ref 136–145)
WBC # BLD AUTO: 6.14 THOUSAND/UL (ref 4.31–10.16)

## 2019-09-24 PROCEDURE — 83735 ASSAY OF MAGNESIUM: CPT | Performed by: HOSPITALIST

## 2019-09-24 PROCEDURE — 99254 IP/OBS CNSLTJ NEW/EST MOD 60: CPT | Performed by: PHYSICIAN ASSISTANT

## 2019-09-24 PROCEDURE — 82088 ASSAY OF ALDOSTERONE: CPT | Performed by: INTERNAL MEDICINE

## 2019-09-24 PROCEDURE — 85025 COMPLETE CBC W/AUTO DIFF WBC: CPT | Performed by: HOSPITALIST

## 2019-09-24 PROCEDURE — G8979 MOBILITY GOAL STATUS: HCPCS

## 2019-09-24 PROCEDURE — G8978 MOBILITY CURRENT STATUS: HCPCS

## 2019-09-24 PROCEDURE — 97116 GAIT TRAINING THERAPY: CPT

## 2019-09-24 PROCEDURE — 84244 ASSAY OF RENIN: CPT | Performed by: INTERNAL MEDICINE

## 2019-09-24 PROCEDURE — 99232 SBSQ HOSP IP/OBS MODERATE 35: CPT | Performed by: HOSPITALIST

## 2019-09-24 PROCEDURE — 97163 PT EVAL HIGH COMPLEX 45 MIN: CPT

## 2019-09-24 PROCEDURE — 99255 IP/OBS CONSLTJ NEW/EST HI 80: CPT | Performed by: INTERNAL MEDICINE

## 2019-09-24 PROCEDURE — 80048 BASIC METABOLIC PNL TOTAL CA: CPT | Performed by: HOSPITALIST

## 2019-09-24 RX ORDER — NIFEDIPINE 30 MG/1
60 TABLET, EXTENDED RELEASE ORAL DAILY
Status: DISCONTINUED | OUTPATIENT
Start: 2019-09-24 | End: 2019-09-25 | Stop reason: HOSPADM

## 2019-09-24 RX ORDER — METHYLPREDNISOLONE SODIUM SUCCINATE 125 MG/2ML
60 INJECTION, POWDER, LYOPHILIZED, FOR SOLUTION INTRAMUSCULAR; INTRAVENOUS EVERY 6 HOURS SCHEDULED
Status: DISCONTINUED | OUTPATIENT
Start: 2019-09-24 | End: 2019-09-25 | Stop reason: HOSPADM

## 2019-09-24 RX ORDER — POTASSIUM CHLORIDE 20 MEQ/1
40 TABLET, EXTENDED RELEASE ORAL 2 TIMES DAILY
Status: COMPLETED | OUTPATIENT
Start: 2019-09-24 | End: 2019-09-25

## 2019-09-24 RX ORDER — SPIRONOLACTONE 50 MG/1
50 TABLET, FILM COATED ORAL 2 TIMES DAILY
Status: DISCONTINUED | OUTPATIENT
Start: 2019-09-24 | End: 2019-09-25 | Stop reason: HOSPADM

## 2019-09-24 RX ORDER — HYDRALAZINE HYDROCHLORIDE 25 MG/1
25 TABLET, FILM COATED ORAL EVERY 8 HOURS PRN
Status: DISCONTINUED | OUTPATIENT
Start: 2019-09-24 | End: 2019-09-25 | Stop reason: HOSPADM

## 2019-09-24 RX ORDER — TIZANIDINE 4 MG/1
4 TABLET ORAL EVERY 8 HOURS PRN
Status: DISCONTINUED | OUTPATIENT
Start: 2019-09-24 | End: 2019-09-25 | Stop reason: HOSPADM

## 2019-09-24 RX ADMIN — HYDROMORPHONE HYDROCHLORIDE 0.5 MG: 1 INJECTION, SOLUTION INTRAMUSCULAR; INTRAVENOUS; SUBCUTANEOUS at 07:57

## 2019-09-24 RX ADMIN — POTASSIUM CHLORIDE 40 MEQ: 1500 TABLET, EXTENDED RELEASE ORAL at 10:11

## 2019-09-24 RX ADMIN — SPIRONOLACTONE 50 MG: 50 TABLET ORAL at 17:05

## 2019-09-24 RX ADMIN — NIFEDIPINE 60 MG: 30 TABLET, FILM COATED, EXTENDED RELEASE ORAL at 15:21

## 2019-09-24 RX ADMIN — METHYLPREDNISOLONE SODIUM SUCCINATE 60 MG: 125 INJECTION, POWDER, FOR SOLUTION INTRAMUSCULAR; INTRAVENOUS at 15:21

## 2019-09-24 RX ADMIN — METHYLPREDNISOLONE SODIUM SUCCINATE 60 MG: 125 INJECTION, POWDER, FOR SOLUTION INTRAMUSCULAR; INTRAVENOUS at 21:12

## 2019-09-24 RX ADMIN — HYDROCHLOROTHIAZIDE 25 MG: 25 TABLET ORAL at 08:04

## 2019-09-24 RX ADMIN — OXYCODONE HYDROCHLORIDE 5 MG: 5 TABLET ORAL at 17:07

## 2019-09-24 RX ADMIN — OXYCODONE HYDROCHLORIDE 5 MG: 5 TABLET ORAL at 21:12

## 2019-09-24 RX ADMIN — LIDOCAINE 1 PATCH: 50 PATCH CUTANEOUS at 21:22

## 2019-09-24 RX ADMIN — LIDOCAINE 1 PATCH: 50 PATCH CUTANEOUS at 08:04

## 2019-09-24 RX ADMIN — OXYCODONE HYDROCHLORIDE 5 MG: 5 TABLET ORAL at 11:57

## 2019-09-24 RX ADMIN — POTASSIUM CHLORIDE 40 MEQ: 1500 TABLET, EXTENDED RELEASE ORAL at 17:05

## 2019-09-24 RX ADMIN — OXYCODONE HYDROCHLORIDE 5 MG: 5 TABLET ORAL at 04:20

## 2019-09-24 NOTE — PLAN OF CARE
Problem: Potential for Falls  Goal: Patient will remain free of falls  Description  INTERVENTIONS:  - Assess patient frequently for physical needs  -  Identify cognitive and physical deficits and behaviors that affect risk of falls    -  [place] fall precautions as indicated by assessment   - Educate patient/family on patient safety including physical limitations  - Instruct patient to call for assistance with activity based on assessment  - Modify environment to reduce risk of injury  - Consider OT/PT consult to assist with strengthening/mobility  9/24/2019 0051 by Shaina Feliz RN  Outcome: Progressing  9/24/2019 0051 by Shaina Feliz RN  Outcome: Progressing     Problem: PAIN - ADULT  Goal: Verbalizes/displays adequate comfort level or baseline comfort level  Description  Interventions:  - Encourage patient to monitor pain and request assistance  - Assess pain using appropriate pain scale  - Administer analgesics based on type and severity of pain and evaluate response  - Implement non-pharmacological measures as appropriate and evaluate response  - Consider cultural and social influences on pain and pain management  - Notify physician/advanced practitioner if interventions unsuccessful or patient reports new pain  9/24/2019 0051 by Shaina Feliz RN  Outcome: Progressing  9/24/2019 0051 by Shaina Feliz RN  Outcome: Progressing     Problem: INFECTION - ADULT  Goal: Absence or prevention of progression during hospitalization  Description  INTERVENTIONS:  - Assess and monitor for signs and symptoms of infection  - Monitor lab/diagnostic results  - Monitor all insertion sites, i e  indwelling lines, tubes, and drains  - Monitor endotracheal if appropriate and nasal secretions for changes in amount and color  - [place] appropriate cooling/warming therapies per order  - Administer medications as ordered  - Instruct and encourage patient and family to use good hand hygiene technique  - Identify and instruct in appropriate isolation precautions for identified infection/condition  9/24/2019 0051 by Channing Hill RN  Outcome: Progressing  9/24/2019 0051 by Channing Hill RN  Outcome: Progressing  Goal: Absence of fever/infection during neutropenic period  Description  INTERVENTIONS:  - Monitor WBC    9/24/2019 0051 by Channing Hill RN  Outcome: Progressing  9/24/2019 0051 by Channing Hill RN  Outcome: Progressing     Problem: SAFETY ADULT  Goal: Patient will remain free of falls  Description  INTERVENTIONS:  - Assess patient frequently for physical needs  -  Identify cognitive and physical deficits and behaviors that affect risk of falls    -  [place] fall precautions as indicated by assessment   - Educate patient/family on patient safety including physical limitations  - Instruct patient to call for assistance with activity based on assessment  - Modify environment to reduce risk of injury  - Consider OT/PT consult to assist with strengthening/mobility  9/24/2019 0051 by Channing Hill RN  Outcome: Progressing  9/24/2019 0051 by Channing Hill RN  Outcome: Progressing  Goal: Maintain or return to baseline ADL function  Description  INTERVENTIONS:  -  Assess patient's ability to carry out ADLs; assess patient's baseline for ADL function and identify physical deficits which impact ability to perform ADLs (bathing, care of mouth/teeth, toileting, grooming, dressing, etc )  - Assess/evaluate cause of self-care deficits   - Assess range of motion  - Assess patient's mobility; develop plan if impaired  - Assess patient's need for assistive devices and provide as appropriate  - Encourage maximum independence but intervene and supervise when necessary  - Involve family in performance of ADLs  - Assess for home care needs following discharge   - Consider OT consult to assist with ADL evaluation and planning for discharge  - Provide patient education as appropriate  9/24/2019 0051 by Channing Hill RN  Outcome: Progressing  9/24/2019 0051 by Ashleigh Combs RN  Outcome: Progressing  Goal: Maintain or return mobility status to optimal level  Description  INTERVENTIONS:  - Assess patient's baseline mobility status (ambulation, transfers, stairs, etc )    - Identify cognitive and physical deficits and behaviors that affect mobility  - Identify mobility aids required to assist with transfers and/or ambulation (gait belt, sit-to-stand, lift, walker, cane, etc )  - [place] fall precautions as indicated by assessment  - Record patient progress and toleration of activity level on Mobility SBAR; progress patient to next Phase/Stage  - Instruct patient to call for assistance with activity based on assessment  - Consider rehabilitation consult to assist with strengthening/weightbearing, etc   9/24/2019 0051 by Ashleigh Combs RN  Outcome: Progressing  9/24/2019 0051 by Ashleigh Combs RN  Outcome: Progressing     Problem: DISCHARGE PLANNING  Goal: Discharge to home or other facility with appropriate resources  Description  INTERVENTIONS:  - Identify barriers to discharge w/patient and caregiver  - Arrange for needed discharge resources and transportation as appropriate  - Identify discharge learning needs (meds, wound care, etc )  - Arrange for interpretive services to assist at discharge as needed  - Refer to Case Management Department for coordinating discharge planning if the patient needs post-hospital services based on physician/advanced practitioner order or complex needs related to functional status, cognitive ability, or social support system  9/24/2019 0051 by Ashleigh Combs RN  Outcome: Progressing  9/24/2019 0051 by Ashleigh Combs RN  Outcome: Progressing     Problem: Knowledge Deficit  Goal: Patient/family/caregiver demonstrates understanding of disease process, treatment plan, medications, and discharge instructions  Description  Complete learning assessment and assess knowledge base    Interventions:  - Provide teaching at level of understanding  - Provide teaching via preferred learning methods  9/24/2019 0051 by Jamaal Ochoa RN  Outcome: Progressing  9/24/2019 0051 by Jamaal Ochoa RN  Outcome: Progressing     Problem: Nutrition/Hydration-ADULT  Goal: Nutrient/Hydration intake appropriate for improving, restoring or maintaining nutritional needs  Description  Monitor and assess patient's nutrition/hydration status for malnutrition  Collaborate with interdisciplinary team and initiate plan and interventions as ordered  Monitor patient's weight and dietary intake as ordered or per policy  Utilize nutrition screening tool and intervene as necessary  Determine patient's food preferences and provide high-protein, high-caloric foods as appropriate       INTERVENTIONS:  - Monitor oral intake, urinary output, labs, and treatment plans  - Assess nutrition and hydration status and recommend course of action  - Evaluate amount of meals eaten  - Assist patient with eating if necessary   - Allow adequate time for meals  - Recommend/ encourage appropriate diets, oral nutritional supplements, and vitamin/mineral supplements  - Order, calculate, and assess calorie counts as needed  - Recommend, monitor, and adjust tube feedings and TPN/PPN based on assessed needs  - Assess need for intravenous fluids  - Provide specific nutrition/hydration education as appropriate  - Include patient/family/caregiver in decisions related to nutrition  9/24/2019 0051 by Jamaal Ochoa RN  Outcome: Progressing  9/24/2019 0051 by Jamaal Ochoa RN  Outcome: Progressing     Problem: RESPIRATORY - ADULT  Goal: Achieves optimal ventilation and oxygenation  Description  INTERVENTIONS:  - Assess for changes in respiratory status  - Assess for changes in mentation and behavior  - Position to facilitate oxygenation and minimize respiratory effort  - Oxygen administered by appropriate delivery if ordered  - Initiate smoking cessation education as indicated  - Encourage broncho-pulmonary hygiene including cough, deep breathe, Incentive Spirometry  - Assess the need for suctioning and aspirate as needed  - Assess and instruct to report SOB or any respiratory difficulty  - Respiratory Therapy support as indicated  Outcome: Progressing     Problem: GENITOURINARY - ADULT  Goal: Maintains or returns to baseline urinary function  Description  INTERVENTIONS:  - Assess urinary function  - Encourage oral fluids to ensure adequate hydration if ordered  - Administer IV fluids as ordered to ensure adequate hydration  - Administer ordered medications as needed  - Offer frequent toileting  - Follow urinary retention protocol if ordered  Outcome: Progressing  Goal: Absence of urinary retention  Description  INTERVENTIONS:  - Assess patients ability to void and empty bladder  - Monitor I/O  - Bladder scan as needed  - Discuss with physician/AP medications to alleviate retention as needed  - Discuss catheterization for long term situations as appropriate  Outcome: Progressing  Goal: Urinary catheter remains patent  Description  INTERVENTIONS:  - Assess patency of urinary catheter  - If patient has a chronic doyle, consider changing catheter if non-functioning  - Follow guidelines for intermittent irrigation of non-functioning urinary catheter  Outcome: Progressing     Problem: METABOLIC, FLUID AND ELECTROLYTES - ADULT  Goal: Electrolytes maintained within normal limits  Description  INTERVENTIONS:  - Monitor labs and assess patient for signs and symptoms of electrolyte imbalances  - Administer electrolyte replacement as ordered  - Monitor response to electrolyte replacements, including repeat lab results as appropriate  - Instruct patient on fluid and nutrition as appropriate  Outcome: Progressing  Goal: Fluid balance maintained  Description  INTERVENTIONS:  - Monitor labs   - Monitor I/O and WT  - Instruct patient on fluid and nutrition as appropriate  - Assess for signs & symptoms of volume excess or deficit  Outcome: Progressing  Goal: Glucose maintained within target range  Description  INTERVENTIONS:  - Monitor Blood Glucose as ordered  - Assess for signs and symptoms of hyperglycemia and hypoglycemia  - Administer ordered medications to maintain glucose within target range  - Assess nutritional intake and initiate nutrition service referral as needed  Outcome: Progressing     Problem: HEMATOLOGIC - ADULT  Goal: Maintains hematologic stability  Description  INTERVENTIONS  - Assess for signs and symptoms of bleeding or hemorrhage  - Monitor labs  - Administer supportive blood products/factors as ordered and appropriate  Outcome: Progressing

## 2019-09-24 NOTE — UTILIZATION REVIEW
Initial Clinical Review    Admission: Date/Time/Statement: Inpatient Admission Orders (From admission, onward)     Ordered        09/23/19 1733  Inpatient Admission (expected length of stay for this patient Order details is greater than two midnights)  Once                   Orders Placed This Encounter   Procedures    Inpatient Admission (expected length of stay for this patient Order details is greater than two midnights)     Standing Status:   Standing     Number of Occurrences:   1     Order Specific Question:   Admitting Physician     Answer:   Priscilla Urrutia [[id number]]     Order Specific Question:   Level of Care     Answer:   Med Surg [16]     Order Specific Question:   Estimated length of stay     Answer:   More than 2 Midnights     Order Specific Question:   Certification     Answer:   I certify that inpatient services are medically necessary for this patient for a duration of greater than two midnights  See H&P and MD Progress Notes for additional information about the patient's course of treatment  ED Arrival Information     Expected Arrival Acuity Means of Arrival Escorted By Service Admission Type    - 9/23/2019 11:39 Urgent Walk-In Family [facility] General Medicine Urgent    Arrival Complaint    Flank Pain        Chief Complaint   Patient presents with    Flank Pain     Patient reporting R sided flank pain which started last night  Patient also reporting n/v  Denies hematuria  Denies hx of kidney stones  Assessment/Plan:     46 Y O  Male  Presents to ED  From home   After waking  With an acute  Onset R  Flank pain  No  previous  Episodes  Denies  Any  PMH  ADMIT  IP MED  SURG  LOC  With   Acute  Low  Back Pain  And plan  Is  Ortho consult, pain control, PT/OT  And   MRI  Per  Ortho  Consult:     48 y o male with right-sided lumbar spine pain  Likely myofascial      Plan:   · Pain Control  · Recommend IV steroids    · WBAT b/l LEs  · PT/OT  · MRI Lumbar spine ordered by [address] [place] [address]      ED Triage Vitals   Temperature Pulse Respirations Blood Pressure SpO2   09/23/19 1144 09/23/19 1145 09/23/19 1145 09/23/19 1145 09/23/19 1145   98 5 °F (36 9 °C) 59 18 (!) 197/100 97 %      Temp Source Heart Rate Source Patient Position - Orthostatic VS BP Location FiO2 (%)   09/23/19 1144 09/23/19 1145 09/23/19 1145 09/23/19 1145 --   Temporal Monitor Sitting Right arm       Pain Score       09/23/19 1145       Worst Possible Pain        Wt Readings from Last 1 Encounters:   09/23/19 112 kg (245 lb 13 oz)     Additional Vital Signs:   24/19 0800            None (Room air)     09/24/19 0742  99 °F (37 2 °C)  53Abnormal   18  153/84  96 %  None (Room air)  Lying   09/23/19 2325  98 2 °F (36 8 °C)  61  18  163/95  96 %  None (Room air)  Lying   09/23/19 1817  98 1 °F (36 7 °C)  68  18  167/89  96 %  None (Room air)  Lying   09/23/19 1755    76  18  172/84Abnormal   99 %  None (Room air)  Lying   09/23/19 1700    74  20  197/90Abnormal   99 %  None (Room air)  Lying   09/23/19 1609    72  18  198/101Abnormal   99 %  None (Room air)  Lying   09/23/19 1518    56  20  175/90Abnormal       Lying   09/23/19 1444        190/100Abnormal       Lying   09/23/19 1422        196/101Abnormal           BP: JAMIE mcginnis notified at 09/23/19 1422   09/23/19 1145  98 4 °F (36 9 °C)  59  18  197/100Abnormal    97 %  None (Room air)         Pertinent Labs/Diagnostic Test Results:   Results from last 7 days   Lab Units 09/24/19  0455 09/23/19  1259   WBC Thousand/uL 6 14 6 65   HEMOGLOBIN g/dL 13 6 14 1   HEMATOCRIT % 42 7 45 9   PLATELETS Thousands/uL 195 201   NEUTROS ABS Thousands/µL 4 29 4 52         Results from last 7 days   Lab Units 09/24/19  0455 09/23/19  1259   SODIUM mmol/L 140 143   POTASSIUM mmol/L 2 7* 2 9*   CHLORIDE mmol/L 101 102   CO2 mmol/L 28 33*   ANION GAP mmol/L 11 8   BUN mg/dL 9 7   CREATININE mg/dL 1 08 0 98   EGFR ml/min/1 73sq m 92 103   CALCIUM mg/dL 8 9 9 1   MAGNESIUM mg/dL 1 7  -- Results from last 7 days   Lab Units 09/23/19  1259   AST U/L 26   ALT U/L 36   ALK PHOS U/L 96   TOTAL PROTEIN g/dL 8 0   ALBUMIN g/dL 3 9   TOTAL BILIRUBIN mg/dL 0 45         Results from last 7 days   Lab Units 09/24/19  0455 09/23/19  1259   GLUCOSE RANDOM mg/dL 108 108           Results from last 7 days   Lab Units 09/23/19  1259   LIPASE u/L 99             Results from last 7 days   Lab Units 09/23/19  1252   CLARITY UA  Slightly Cloudy   COLOR UA  Yellow   SPEC GRAV UA  1 020   PH UA  8 0   GLUCOSE UA mg/dl Negative   KETONES UA mg/dl Negative   BLOOD UA  Small*   PROTEIN UA mg/dl Trace*   NITRITE UA  Negative   BILIRUBIN UA  Negative   UROBILINOGEN UA E U /dl 0 2   LEUKOCYTES UA  Negative   WBC UA /hpf 2-4*   RBC UA /hpf None Seen   BACTERIA UA /hpf None Seen   EPITHELIAL CELLS WET PREP /hpf Occasional     CTA chest/abd/pelvis  ( 9/23)    No evidence for aortic dissection  Redemonstration of a hypodensity within the upper pole the left kidney with adjacent stranding is identified on noncontrast CT performed earlier the same day   Differential diagnosis should include an area of focal bacterial pyelonephritis versus   possibly a cyst with rupture   Correlate with urinalysis  Indeterminate enhancing lesion within the right hepatic lobe, as described above   Correlation with nonemergent contrast-enhanced MRI of the abdomen utilizing liver protocol is recommended for further evaluation  CT  Abd/pelvis  ( 9/23)     No urinary tract calculus   No hydronephrosis      Nonspecific inflammatory type stranding in the focal anterolateral left perinephric fat, of uncertain etiology   In the appropriate clinical setting, this can be seen in patients with history of unilateral left pyelonephritis   Other etiologies for this   indeterminate finding are not excluded   If no clinical explanation for this finding, which is of questionable clinical significance, can be found, consider nonemergent follow-up noncontrast and contrast-enhanced renal protocol CT to exclude the unlikely   possibility that there is a left renal mass        ED Treatment:   Medication Administration from 09/23/2019 1139 to 09/23/2019 1817       Date/Time Order Dose Route Action Comments     09/23/2019 1300 ondansetron (ZOFRAN) injection 4 mg 4 mg Intravenous Given      09/23/2019 1300 ketorolac (TORADOL) injection 15 mg 15 mg Intravenous Given      09/23/2019 1523 sodium chloride 0 9 % bolus 1,000 mL 0 mL Intravenous Stopped      09/23/2019 1304 sodium chloride 0 9 % bolus 1,000 mL 1,000 mL Intravenous New Bag      09/23/2019 1331 ondansetron (ZOFRAN) injection 4 mg 4 mg Intravenous Given      09/23/2019 1335 ketorolac (TORADOL) injection 15 mg 15 mg Intravenous Given      09/23/2019 1400 hydrochlorothiazide (HYDRODIURIL) tablet 25 mg 25 mg Oral Given      09/23/2019 1400 amLODIPine (NORVASC) tablet 10 mg 10 mg Oral Given      09/23/2019 1603 iohexol (OMNIPAQUE) 350 MG/ML injection (MULTI-DOSE) 100 mL 100 mL Intravenous Given      09/23/2019 1642 diazepam (VALIUM) injection 2 5 mg 2 5 mg Intramuscular Given      09/23/2019 1738 morphine (PF) 4 mg/mL injection 4 mg 4 mg Intravenous Given      09/23/2019 1749 potassium chloride 20 mEq IVPB (premix) 20 mEq Intravenous New Bag      09/23/2019 1755 sodium chloride 0 9 % bolus 500 mL 500 mL Intravenous New Bag         Past Medical History:   Diagnosis Date    Hypertension        Admitting Diagnosis: Hypokalemia [E87 6]  Hypertension [I10]  Nausea and vomiting [R11 2]  Flank pain [R10 9]  Intractable low back pain [M54 5]  Age/Sex: 46 y o  male  Admission Orders:    Current Facility-Administered Medications:  acetaminophen 650 mg Oral Q6H PRN   cloNIDine 0 1 mg Transdermal Weekly   hydrALAZINE 10 mg Intravenous Q6H PRN    X1  9/23   hydrochlorothiazide 25 mg Oral Daily   HYDROmorphone 0 5 mg Intravenous Q4H PRN       X1  9/23  And  X1  9/24 thus  far   lidocaine 1 patch Topical Daily   ondansetron 4 mg Intravenous Q4H PRN    X1  9/23   oxyCODONE 5 mg Oral Q4H PRN   potassium chloride 40 mEq Oral BID   traMADol 50 mg Oral Q6H PRN       IP CONSULT TO ORTHOPEDIC SURGERY  IP CONSULT TO NEPHROLOGY       MRI   L/S  spine    Network Utilization Review Department  Phone: [phone]; Fax [phone]  [email or web address]  ATTENTION: Please call with any questions or concerns to [phone]  and carefully listen to the prompts so that you are directed to the right person  Send all requests for admission clinical reviews, approved or denied determinations and any other requests to fax [phone]   All voicemails are confidential

## 2019-09-24 NOTE — ASSESSMENT & PLAN NOTE
Unclear cause  CT abd/pelvis negative  No aortic dissection  Awaiting MRI lumbar spine    Appreciate ortho assistance  I will add solumedrol per their rec's in case it is myofascial pain

## 2019-09-24 NOTE — PLAN OF CARE
Problem: Potential for Falls  Goal: Patient will remain free of falls  Description  INTERVENTIONS:  - Assess patient frequently for physical needs  -  Identify cognitive and physical deficits and behaviors that affect risk of falls    -  [place] fall precautions as indicated by assessment   - Educate patient/family on patient safety including physical limitations  - Instruct patient to call for assistance with activity based on assessment  - Modify environment to reduce risk of injury  - Consider OT/PT consult to assist with strengthening/mobility  Outcome: Progressing     Problem: PAIN - ADULT  Goal: Verbalizes/displays adequate comfort level or baseline comfort level  Description  Interventions:  - Encourage patient to monitor pain and request assistance  - Assess pain using appropriate pain scale  - Administer analgesics based on type and severity of pain and evaluate response  - Implement non-pharmacological measures as appropriate and evaluate response  - Consider cultural and social influences on pain and pain management  - Notify physician/advanced practitioner if interventions unsuccessful or patient reports new pain  Outcome: Progressing     Problem: INFECTION - ADULT  Goal: Absence or prevention of progression during hospitalization  Description  INTERVENTIONS:  - Assess and monitor for signs and symptoms of infection  - Monitor lab/diagnostic results  - Monitor all insertion sites, i e  indwelling lines, tubes, and drains  - Monitor endotracheal if appropriate and nasal secretions for changes in amount and color  - [place] appropriate cooling/warming therapies per order  - Administer medications as ordered  - Instruct and encourage patient and family to use good hand hygiene technique  - Identify and instruct in appropriate isolation precautions for identified infection/condition  Outcome: Progressing  Goal: Absence of fever/infection during neutropenic period  Description  INTERVENTIONS:  - Monitor WBC    Outcome: Progressing     Problem: SAFETY ADULT  Goal: Patient will remain free of falls  Description  INTERVENTIONS:  - Assess patient frequently for physical needs  -  Identify cognitive and physical deficits and behaviors that affect risk of falls    -  [place] fall precautions as indicated by assessment   - Educate patient/family on patient safety including physical limitations  - Instruct patient to call for assistance with activity based on assessment  - Modify environment to reduce risk of injury  - Consider OT/PT consult to assist with strengthening/mobility  Outcome: Progressing  Goal: Maintain or return to baseline ADL function  Description  INTERVENTIONS:  -  Assess patient's ability to carry out ADLs; assess patient's baseline for ADL function and identify physical deficits which impact ability to perform ADLs (bathing, care of mouth/teeth, toileting, grooming, dressing, etc )  - Assess/evaluate cause of self-care deficits   - Assess range of motion  - Assess patient's mobility; develop plan if impaired  - Assess patient's need for assistive devices and provide as appropriate  - Encourage maximum independence but intervene and supervise when necessary  - Involve family in performance of ADLs  - Assess for home care needs following discharge   - Consider OT consult to assist with ADL evaluation and planning for discharge  - Provide patient education as appropriate  Outcome: Progressing  Goal: Maintain or return mobility status to optimal level  Description  INTERVENTIONS:  - Assess patient's baseline mobility status (ambulation, transfers, stairs, etc )    - Identify cognitive and physical deficits and behaviors that affect mobility  - Identify mobility aids required to assist with transfers and/or ambulation (gait belt, sit-to-stand, lift, walker, cane, etc )  - [place] fall precautions as indicated by assessment  - Record patient progress and toleration of activity level on Mobility SBAR; progress patient to next Phase/Stage  - Instruct patient to call for assistance with activity based on assessment  - Consider rehabilitation consult to assist with strengthening/weightbearing, etc   Outcome: Progressing     Problem: DISCHARGE PLANNING  Goal: Discharge to home or other facility with appropriate resources  Description  INTERVENTIONS:  - Identify barriers to discharge w/patient and caregiver  - Arrange for needed discharge resources and transportation as appropriate  - Identify discharge learning needs (meds, wound care, etc )  - Arrange for interpretive services to assist at discharge as needed  - Refer to Case Management Department for coordinating discharge planning if the patient needs post-hospital services based on physician/advanced practitioner order or complex needs related to functional status, cognitive ability, or social support system  Outcome: Progressing     Problem: Knowledge Deficit  Goal: Patient/family/caregiver demonstrates understanding of disease process, treatment plan, medications, and discharge instructions  Description  Complete learning assessment and assess knowledge base  Interventions:  - Provide teaching at level of understanding  - Provide teaching via preferred learning methods  Outcome: Progressing     Problem: Nutrition/Hydration-ADULT  Goal: Nutrient/Hydration intake appropriate for improving, restoring or maintaining nutritional needs  Description  Monitor and assess patient's nutrition/hydration status for malnutrition  Collaborate with interdisciplinary team and initiate plan and interventions as ordered  Monitor patient's weight and dietary intake as ordered or per policy  Utilize nutrition screening tool and intervene as necessary  Determine patient's food preferences and provide high-protein, high-caloric foods as appropriate       INTERVENTIONS:  - Monitor oral intake, urinary output, labs, and treatment plans  - Assess nutrition and hydration status and recommend course of action  - Evaluate amount of meals eaten  - Assist patient with eating if necessary   - Allow adequate time for meals  - Recommend/ encourage appropriate diets, oral nutritional supplements, and vitamin/mineral supplements  - Order, calculate, and assess calorie counts as needed  - Recommend, monitor, and adjust tube feedings and TPN/PPN based on assessed needs  - Assess need for intravenous fluids  - Provide specific nutrition/hydration education as appropriate  - Include patient/family/caregiver in decisions related to nutrition  Outcome: Progressing     Problem: RESPIRATORY - ADULT  Goal: Achieves optimal ventilation and oxygenation  Description  INTERVENTIONS:  - Assess for changes in respiratory status  - Assess for changes in mentation and behavior  - Position to facilitate oxygenation and minimize respiratory effort  - Oxygen administered by appropriate delivery if ordered  - Initiate smoking cessation education as indicated  - Encourage broncho-pulmonary hygiene including cough, deep breathe, Incentive Spirometry  - Assess the need for suctioning and aspirate as needed  - Assess and instruct to report SOB or any respiratory difficulty  - Respiratory Therapy support as indicated  Outcome: Progressing     Problem: GENITOURINARY - ADULT  Goal: Maintains or returns to baseline urinary function  Description  INTERVENTIONS:  - Assess urinary function  - Encourage oral fluids to ensure adequate hydration if ordered  - Administer IV fluids as ordered to ensure adequate hydration  - Administer ordered medications as needed  - Offer frequent toileting  - Follow urinary retention protocol if ordered  Outcome: Progressing  Goal: Absence of urinary retention  Description  INTERVENTIONS:  - Assess patients ability to void and empty bladder  - Monitor I/O  - Bladder scan as needed  - Discuss with physician/AP medications to alleviate retention as needed  - Discuss catheterization for long term situations as appropriate  Outcome: Progressing  Goal: Urinary catheter remains patent  Description  INTERVENTIONS:  - Assess patency of urinary catheter  - If patient has a chronic doyle, consider changing catheter if non-functioning  - Follow guidelines for intermittent irrigation of non-functioning urinary catheter  Outcome: Progressing     Problem: METABOLIC, FLUID AND ELECTROLYTES - ADULT  Goal: Electrolytes maintained within normal limits  Description  INTERVENTIONS:  - Monitor labs and assess patient for signs and symptoms of electrolyte imbalances  - Administer electrolyte replacement as ordered  - Monitor response to electrolyte replacements, including repeat lab results as appropriate  - Instruct patient on fluid and nutrition as appropriate  Outcome: Progressing  Goal: Fluid balance maintained  Description  INTERVENTIONS:  - Monitor labs   - Monitor I/O and WT  - Instruct patient on fluid and nutrition as appropriate  - Assess for signs & symptoms of volume excess or deficit  Outcome: Progressing  Goal: Glucose maintained within target range  Description  INTERVENTIONS:  - Monitor Blood Glucose as ordered  - Assess for signs and symptoms of hyperglycemia and hypoglycemia  - Administer ordered medications to maintain glucose within target range  - Assess nutritional intake and initiate nutrition service referral as needed  Outcome: Progressing     Problem: HEMATOLOGIC - ADULT  Goal: Maintains hematologic stability  Description  INTERVENTIONS  - Assess for signs and symptoms of bleeding or hemorrhage  - Monitor labs  - Administer supportive blood products/factors as ordered and appropriate  Outcome: Progressing

## 2019-09-24 NOTE — PLAN OF CARE
Problem: Potential for Falls  Goal: Patient will remain free of falls  Description  INTERVENTIONS:  - Assess patient frequently for physical needs  -  Identify cognitive and physical deficits and behaviors that affect risk of falls    -  [place] fall precautions as indicated by assessment   - Educate patient/family on patient safety including physical limitations  - Instruct patient to call for assistance with activity based on assessment  - Modify environment to reduce risk of injury  - Consider OT/PT consult to assist with strengthening/mobility  Outcome: Progressing     Problem: PAIN - ADULT  Goal: Verbalizes/displays adequate comfort level or baseline comfort level  Description  Interventions:  - Encourage patient to monitor pain and request assistance  - Assess pain using appropriate pain scale  - Administer analgesics based on type and severity of pain and evaluate response  - Implement non-pharmacological measures as appropriate and evaluate response  - Consider cultural and social influences on pain and pain management  - Notify physician/advanced practitioner if interventions unsuccessful or patient reports new pain  Outcome: Progressing     Problem: INFECTION - ADULT  Goal: Absence or prevention of progression during hospitalization  Description  INTERVENTIONS:  - Assess and monitor for signs and symptoms of infection  - Monitor lab/diagnostic results  - Monitor all insertion sites, i e  indwelling lines, tubes, and drains  - Monitor endotracheal if appropriate and nasal secretions for changes in amount and color  - [place] appropriate cooling/warming therapies per order  - Administer medications as ordered  - Instruct and encourage patient and family to use good hand hygiene technique  - Identify and instruct in appropriate isolation precautions for identified infection/condition  Outcome: Progressing  Goal: Absence of fever/infection during neutropenic period  Description  INTERVENTIONS:  - Monitor WBC    Outcome: Progressing     Problem: SAFETY ADULT  Goal: Patient will remain free of falls  Description  INTERVENTIONS:  - Assess patient frequently for physical needs  -  Identify cognitive and physical deficits and behaviors that affect risk of falls    -  [place] fall precautions as indicated by assessment   - Educate patient/family on patient safety including physical limitations  - Instruct patient to call for assistance with activity based on assessment  - Modify environment to reduce risk of injury  - Consider OT/PT consult to assist with strengthening/mobility  Outcome: Progressing  Goal: Maintain or return to baseline ADL function  Description  INTERVENTIONS:  -  Assess patient's ability to carry out ADLs; assess patient's baseline for ADL function and identify physical deficits which impact ability to perform ADLs (bathing, care of mouth/teeth, toileting, grooming, dressing, etc )  - Assess/evaluate cause of self-care deficits   - Assess range of motion  - Assess patient's mobility; develop plan if impaired  - Assess patient's need for assistive devices and provide as appropriate  - Encourage maximum independence but intervene and supervise when necessary  - Involve family in performance of ADLs  - Assess for home care needs following discharge   - Consider OT consult to assist with ADL evaluation and planning for discharge  - Provide patient education as appropriate  Outcome: Progressing  Goal: Maintain or return mobility status to optimal level  Description  INTERVENTIONS:  - Assess patient's baseline mobility status (ambulation, transfers, stairs, etc )    - Identify cognitive and physical deficits and behaviors that affect mobility  - Identify mobility aids required to assist with transfers and/or ambulation (gait belt, sit-to-stand, lift, walker, cane, etc )  - [place] fall precautions as indicated by assessment  - Record patient progress and toleration of activity level on Mobility SBAR; progress patient to next Phase/Stage  - Instruct patient to call for assistance with activity based on assessment  - Consider rehabilitation consult to assist with strengthening/weightbearing, etc   Outcome: Progressing     Problem: DISCHARGE PLANNING  Goal: Discharge to home or other facility with appropriate resources  Description  INTERVENTIONS:  - Identify barriers to discharge w/patient and caregiver  - Arrange for needed discharge resources and transportation as appropriate  - Identify discharge learning needs (meds, wound care, etc )  - Arrange for interpretive services to assist at discharge as needed  - Refer to Case Management Department for coordinating discharge planning if the patient needs post-hospital services based on physician/advanced practitioner order or complex needs related to functional status, cognitive ability, or social support system  Outcome: Progressing     Problem: Knowledge Deficit  Goal: Patient/family/caregiver demonstrates understanding of disease process, treatment plan, medications, and discharge instructions  Description  Complete learning assessment and assess knowledge base  Interventions:  - Provide teaching at level of understanding  - Provide teaching via preferred learning methods  Outcome: Progressing     Problem: Nutrition/Hydration-ADULT  Goal: Nutrient/Hydration intake appropriate for improving, restoring or maintaining nutritional needs  Description  Monitor and assess patient's nutrition/hydration status for malnutrition  Collaborate with interdisciplinary team and initiate plan and interventions as ordered  Monitor patient's weight and dietary intake as ordered or per policy  Utilize nutrition screening tool and intervene as necessary  Determine patient's food preferences and provide high-protein, high-caloric foods as appropriate       INTERVENTIONS:  - Monitor oral intake, urinary output, labs, and treatment plans  - Assess nutrition and hydration status and recommend course of action  - Evaluate amount of meals eaten  - Assist patient with eating if necessary   - Allow adequate time for meals  - Recommend/ encourage appropriate diets, oral nutritional supplements, and vitamin/mineral supplements  - Order, calculate, and assess calorie counts as needed  - Recommend, monitor, and adjust tube feedings and TPN/PPN based on assessed needs  - Assess need for intravenous fluids  - Provide specific nutrition/hydration education as appropriate  - Include patient/family/caregiver in decisions related to nutrition  Outcome: Progressing

## 2019-09-24 NOTE — PROGRESS NOTES
Progress Note - Ginger Mcneill 1970, 46 y o  male MRN: [id number]    Unit/Bed#: E5 -01 Encounter: [id number]    Primary Care Provider: Margoth Green DO   Date and time admitted to hospital: 2019 11:47 AM        Hypertensive urgency  Assessment & Plan  Appreciate renal help  Secondary cause workup underway    * Acute low back pain  Assessment & Plan  Unclear cause  CT abd/pelvis negative  No aortic dissection  Awaiting MRI lumbar spine    Appreciate ortho assistance  I will add solumedrol per their rec's in case it is myofascial pain  Subjective:    feels a little better  Still having some right sided back pain, but it is 50% better      Objective:     Vitals:   Temp (24hrs), Av 5 °F (36 9 °C), Min:98 1 °F (36 7 °C), Max:99 °F (37 2 °C)    Temp:  [98 1 °F (36 7 °C)-99 °F (37 2 °C)] 98 7 °F (37 1 °C)  HR:  [53-76] 57  Resp:  [18-20] 18  BP: (142-197)/(79-95) 142/79  SpO2:  [96 %-99 %] 96 %  Body mass index is 30 72 kg/m²  Input and Output Summary (last 24 hours): Intake/Output Summary (Last 24 hours) at 2019 1623  Last data filed at 2019 1351  Gross per 24 hour   Intake 720 ml   Output 100 ml   Net 620 ml       Physical Exam:     Physical Exam   HENT:   Head: Normocephalic and atraumatic  Eyes: Pupils are equal, round, and reactive to light  EOM are normal    Cardiovascular: Normal rate and regular rhythm  Exam reveals no gallop and no friction rub  No murmur heard  Pulmonary/Chest: Effort normal and breath sounds normal  He has no wheezes  He has no rales  Abdominal: Soft  Bowel sounds are normal  There is no tenderness  Musculoskeletal: He exhibits no edema  Back:   Pain to palpation of musculature above right PSIS  Some ropiness  No mass felt      Nursing note and vitals reviewed              Additional Data:     Labs:    Results from last 7 days   Lab Units 19  0455   WBC Thousand/uL 6 14   HEMOGLOBIN g/dL 13 6   HEMATOCRIT % 42 7 PLATELETS Thousands/uL 195   NEUTROS PCT % 69   LYMPHS PCT % 18   MONOS PCT % 11   EOS PCT % 1     Results from last 7 days   Lab Units 09/24/19  0455 09/23/19  1259   POTASSIUM mmol/L 2 7* 2 9*   CHLORIDE mmol/L 101 102   CO2 mmol/L 28 33*   BUN mg/dL 9 7   CREATININE mg/dL 1 08 0 98   CALCIUM mg/dL 8 9 9 1   ALK PHOS U/L  --  96   ALT U/L  --  36   AST U/L  --  26                       * I Have Reviewed All Lab Data     Recent Cultures (last 7 days):             Last 24 Hours Medication List:     Current Facility-Administered Medications:  acetaminophen 650 mg Oral Q6H PRN Carlee Macias PA-C   hydrALAZINE 25 mg Oral Q8H PRN Elizabeth Bland MD   hydrochlorothiazide 25 mg Oral Daily Jolena Scheuermann, PA-C   HYDROmorphone 0 5 mg Intravenous Q4H PRN Zo Hanley, DO   lidocaine 1 patch Topical Daily Sukumar Davis PA-C   methylPREDNISolone sodium succinate 60 mg Intravenous Q6H [address] Hamilton Herzog, DO   NIFEdipine 60 mg Oral Daily Elizabeth Bland MD   ondansetron 4 mg Intravenous Q4H PRN Zo Hanley, DO   oxyCODONE 5 mg Oral Q4H PRN Carlee Macias PA-C   potassium chloride 40 mEq Oral BID Hamilton Herzog, DO   spironolactone 50 mg Oral BID Elizabeth Bland MD   traMADol 50 mg Oral Q6H PRN Carlee Macias PA-C         VTE Pharmacologic Prophylaxis:   Pharmacologic: ambulating      Current Length of Stay: 1 day(s)    Current Patient Status: Inpatient       Discharge Plan: home likely tomorrow    Code Status: Level 1 - Full Code           Today, Patient Was Seen By: Cheyanne Caraballo DO    ** Please Note: Dictation voice to text software may have been used in the creation of this document   **

## 2019-09-24 NOTE — CONSULTS
Consultation    Nephrology   Ann Velazquez 46 y o  male MRN: [id number]  Unit/Bed#: E5 -01 Encounter: [id number]    History of Present Illness   Physician Requesting Consult: Lin Kidd DO  Reason for Consult : -  elevated blood pressures and hypokalemia    ASSESSMENT/PLAN:   46 y o   male with pmh of hypertension for over 20 years who was admitted for acute right flank pain  Nephrology has been consulted for evaluation and management of elevated blood pressures and hypokalemia  1) hypertension:  - likely has underlying essential hypertension  - however could have some component of APO-1 gene mutation which is known in  [place] for causing elevated blood pressures  - some degree of hypertension is secondary to his pain component as well as use of NSAIDs   - unlikely to be secondary hypertension  - CT abdomen from 09/23/2019 shows no adrenal abnormality or renal abnormality  - will check renal and, aldosterone, plasma metanephrine levels  - patient has a history of angioedema secondary to lisinopril   - discussed side effects of Aldactone with the patient  He he is willing to rechallenge and started  - his hypokalemia is likely secondary to his HCTZ use  - advised patient that we would place him on a regimen including HCTZ 25 mg p o  Q day, Aldactone 50 mg p o  B i d , Procardia XL 60 mg p o  Q day and for now hydralazine 25 mg p o  Q 8 p r n  If SBP greater than 160  We will discontinue his clonidine   - he will need close follow-up upon discharge for his blood pressure management  - advised him for risk for CKD in the future due to uncontrolled hypertension   - After review of records it appears that the patient has a baseline Creatinine of 1 2-1 4mg/dL  - patient was admitted with a creatinine of 0 98 mg/dL  - patient's creatinine today is at 1 08 mg/dL    - clinically patient appears to be euvolemic  - Avoid nephrotoxins, adjust meds to appropriate GFR   - Acid base and lytes stable except hypokalemia, alkalosis  - Optimize hemodynamic status to avoid delay in renal recovery  - check BMP, magnesium, phosphorus in a m   - Strict I/O     3)H/H:  - most recent hemoglobin at 13 6 grams/deciliter  - maintain hemoglobin greater than 8 grams/deciliter    4)Volume status:  - Clinically patient appears to be euvolemic  5) hypokalemia:  - replacement ordered  - Continue to monitor for now  - recheck BMP      Thanks for the consult  Will continue to follow  Please call with questions/ concerns  Above-mentioned orders and Plan with regards to hypertension and hypokalemia was discussed with the team in depth  Eloy Opitz, MD, FASN, 9/24/2019, 2:02 PM          HISTORY OF PRESENT ILLNESS:   Ginger Mcneill is a 46 y o   male with pmh of hypertension for over 20 years who was admitted for acute right flank pain  Nephrology has been consulted for evaluation and management of elevated blood pressures and hypokalemia  Patient seen and examined in his room  Patient reports he has had high uncontrolled blood pressure for more than 20 years  He has never seen a nephrologist   He reports in the past he has been on multiple medications  Currently prior to admission he was on HCTZ 25 mg p o  Q day Norvasc 10 mg p o  Q day only  He also takes routine NSAIDs at home for his chronic other pain issues  Reports he has had an episode of angioedema secondary to lisinopril  Reports he was tried on Aldactone in the past and he felt tired so stopped it  No other side effects were noted  There is a discrepancy in terms of what medications a recorded for his homeless and what he is telling me on exam   Denies any drug use denies any licorice use  Reports his problem with potassium has only been for the last 6-12 months  Does not have any history of early age hypertension in the family    Since admission patient has received as CT with IV contrast on 09/23/2019 which did not show any acute renal abnormality  Normal adrenals  He was admitted with a creatinine of 0 98 mg/dL  His baseline creatinine is around 1 2-1 4 mg/dL  Upon admission initially was started on Norvasc and now on was started on clonidine patch  He also received doses of Toradol  History obtained from chart review and the patient    Consults    Review of Systems   Constitutional: Negative for activity change, appetite change, fatigue and fever  HENT: Negative for congestion and rhinorrhea  Respiratory: Negative for cough, shortness of breath and wheezing  Cardiovascular: Negative for chest pain and palpitations  Gastrointestinal: Negative for abdominal pain, constipation, diarrhea, nausea and vomiting  Endocrine: Positive for polyuria  Genitourinary: Negative for difficulty urinating and dysuria  Musculoskeletal: Negative for back pain  Skin: Negative for rash  Neurological: Negative for dizziness and headaches  Psychiatric/Behavioral: Negative for confusion  All other systems reviewed and are negative  Historical Information   Patient Active Problem List   Diagnosis    HTN (hypertension)    Acute low back pain    Hypertensive urgency     Past Medical History:   Diagnosis Date    Hypertension      History reviewed  No pertinent surgical history  Social History   Social History     Substance and Sexual Activity   Alcohol Use No     Social History     Substance and Sexual Activity   Drug Use No     Social History     Tobacco Use   Smoking Status Never Smoker   Smokeless Tobacco Never Used     History reviewed  No pertinent family history      Meds/Allergies   current meds:   Current Facility-Administered Medications   Medication Dose Route Frequency    acetaminophen (TYLENOL) tablet 650 mg  650 mg Oral Q6H PRN    cloNIDine (CATAPRES-TTS-1) 0 1 mg/24 hr TD weekly patch  0 1 mg Transdermal Weekly    hydrALAZINE (APRESOLINE) injection 10 mg  10 mg Intravenous Q6H PRN    hydrochlorothiazide (HYDRODIURIL) tablet 25 mg  25 mg Oral Daily    HYDROmorphone (DILAUDID) injection 0 5 mg  0 5 mg Intravenous Q4H PRN    lidocaine (LIDODERM) 5 % patch 1 patch  1 patch Topical Daily    ondansetron (ZOFRAN) injection 4 mg  4 mg Intravenous Q4H PRN    oxyCODONE (ROXICODONE) IR tablet 5 mg  5 mg Oral Q4H PRN    potassium chloride (K-DUR,KLOR-CON) CR tablet 40 mEq  40 mEq Oral BID    traMADol (ULTRAM) tablet 50 mg  50 mg Oral Q6H PRN    and PTA meds:   Prior to Admission Medications   Prescriptions Last Dose Informant Patient Reported? Taking?    Riboflavin 400 MG CAPS   No No   Sig: Take 1 capsule by mouth daily for 30 days   amLODIPine (NORVASC) 10 mg tablet 9/23/2019 at Unknown time  Yes Yes   Sig: Take 10 mg by mouth daily   atenolol-chlorthalidone (TENORETIC) 50-25 mg per tablet Not Taking at Unknown time  Yes No   Sig: Take 1 tablet by mouth daily   hydrochlorothiazide (HYDRODIURIL) 25 mg tablet 9/23/2019 at Unknown time  Yes Yes   Sig: Take 25 mg by mouth daily   ibuprofen (MOTRIN) 600 mg tablet   No No   Sig: Take 1 tablet by mouth every 6 (six) hours as needed for mild pain for up to 7 days   magnesium oxide (MAG-OX) 400 mg   No No   Sig: Take 1 tablet by mouth daily for 30 days   potassium chloride (K-DUR,KLOR-CON) 20 mEq tablet Not Taking at Unknown time  Yes No   Sig: Take 40 mEq by mouth   spironolactone (ALDACTONE) 25 mg tablet Not Taking at Unknown time  Yes No   Sig: Take 1 tablet by mouth      Facility-Administered Medications: None       Scheduled Meds:  Current Facility-Administered Medications:  acetaminophen 650 mg Oral Q6H PRN Carlee Macias PA-C   cloNIDine 0 1 mg Transdermal Weekly Hamilton Herzog, DO   hydrALAZINE 10 mg Intravenous Q6H PRN Zo Hanley, DO   hydrochlorothiazide 25 mg Oral Daily Demetria Parsons PA-C   HYDROmorphone 0 5 mg Intravenous Q4H PRN Zo Hanley, DO   lidocaine 1 patch Topical Daily Sukumar Davis PA-C   ondansetron 4 mg Intravenous Q4H PRN Zo Hanley, DO oxyCODONE 5 mg Oral Q4H PRN Levell Terry Davis PA-C   potassium chloride 40 mEq Oral BID Marvin Herman, DO   traMADol 50 mg Oral Q6H PRN Meena Hdz PA-C       PRN Meds:   acetaminophen    hydrALAZINE    HYDROmorphone    ondansetron    oxyCODONE    traMADol    Continuous Infusions: Allergies   Allergen Reactions    Lisinopril Angioedema         Invasive Devices: Invasive Devices     Peripheral Intravenous Line            Peripheral IV 19 Left Forearm 1 day                  PHYSICAL EXAM  /84 (BP Location: Right arm)   Pulse (!) 53   Temp 99 °F (37 2 °C) (Temporal)   Resp 18   Wt 112 kg (245 lb 13 oz)   SpO2 96%   BMI 30 72 kg/m²   Temp (24hrs), Av 4 °F (36 9 °C), Min:98 1 °F (36 7 °C), Max:99 °F (37 2 °C)        Intake/Output Summary (Last 24 hours) at 2019 1402  Last data filed at 2019 1351  Gross per 24 hour   Intake 1720 ml   Output 100 ml   Net 1620 ml       I/O last 24 hours: In: 6459 [P O :720; IV Piggyback:1000]  Out: 100 [Urine:100]          Current Weight: Weight - Scale: 112 kg (245 lb 13 oz)  First Weight: Weight - Scale: 112 kg (245 lb 13 oz)  Physical Exam   Constitutional: He is oriented to person, place, and time  He appears well-developed and well-nourished  No distress  HENT:   Head: Normocephalic and atraumatic  Mouth/Throat: Oropharynx is clear and moist  No oropharyngeal exudate  Eyes: Conjunctivae are normal  No scleral icterus  Neck: Neck supple  No JVD present  Cardiovascular: Normal heart sounds  Exam reveals no friction rub  Pulmonary/Chest: Effort normal  He has no wheezes  He has no rales  Abdominal: Soft  He exhibits no mass  There is no tenderness  Musculoskeletal: He exhibits no edema  Neurological: He is alert and oriented to person, place, and time  Skin: Skin is warm  He is not diaphoretic  No pallor  Psychiatric: He has a normal mood and affect   His behavior is normal    Nursing note and vitals reviewed  LABORATORY:    Results from last 7 days   Lab Units 09/24/19  0455 09/23/19  1259   WBC Thousand/uL 6 14 6 65   HEMOGLOBIN g/dL 13 6 14 1   HEMATOCRIT % 42 7 45 9   PLATELETS Thousands/uL 195 201   POTASSIUM mmol/L 2 7* 2 9*   CHLORIDE mmol/L 101 102   CO2 mmol/L 28 33*   BUN mg/dL 9 7   CREATININE mg/dL 1 08 0 98   CALCIUM mg/dL 8 9 9 1   MAGNESIUM mg/dL 1 7  --       rest all reviewed    RADIOLOGY:  CTA dissection protocol chest abdomen pelvis w wo contrast   Final Result by Charley Bray MD (09/23 1645)      No evidence for aortic dissection  Redemonstration of a hypodensity within the upper pole the left kidney with adjacent stranding is identified on noncontrast CT performed earlier the same day  Differential diagnosis should include an area of focal bacterial pyelonephritis versus    possibly a cyst with rupture  Correlate with urinalysis  Indeterminate enhancing lesion within the right hepatic lobe, as described above  Correlation with nonemergent contrast-enhanced MRI of the abdomen utilizing liver protocol is recommended for further evaluation  Workstation performed: [id number]         CT renal stone study abdomen pelvis without contrast   Final Result by Anu Watson MD (09/23 1321)      No urinary tract calculus  No hydronephrosis  Nonspecific inflammatory type stranding in the focal anterolateral left perinephric fat, of uncertain etiology  In the appropriate clinical setting, this can be seen in patients with history of unilateral left pyelonephritis  Other etiologies for this    indeterminate finding are not excluded  If no clinical explanation for this finding, which is of questionable clinical significance, can be found, consider nonemergent follow-up noncontrast and contrast-enhanced renal protocol CT to exclude the unlikely    possibility that there is a left renal mass        Workstation performed: [id number]         MRI inpatient order    (Results Pending)     Rest all reviewed    Portions of the record may have been created with voice recognition software  Occasional wrong word or "sound a like" substitutions may have occurred due to the inherent limitations of voice recognition software  Read the chart carefully and recognize, using context, where substitutions have occurred  If you have any questions, please contact the dictating provider

## 2019-09-24 NOTE — CONSULTS
Orthopedics   Judi Matt 46 y o  male MRN: [id number]  Unit/Bed#: E5 MS 19538      Chief Complaint:   Right-sided low back pain    HPI:   46 y o male seen in consultation by orthopedics requested by Dr Trisha Elias for evaluation of patient's lumbar spine  Patient notes a two day history of low back pain  He denies any injury or change in his routine  Patient states he was standing in the shower when he fell an onset of pain in the right side of his lumbar spine  Pain is worse with standing and ambulating  He reported to the emergency department and was subsequently admitted  Currently he notes persistent pain in the right side lumbar spine  He denies any radiation distally into the lower extremities  He denies any weakness or numbness and tingling in the lower extremities as well  No bowel or bladder dysfunction  No fevers or chills  Review Of Systems:   · Skin: See HPI  · Neuro: See HPI  · Musculoskeletal: See HPI  · 14 point review of systems negative except as stated above     Past Medical History:   Past Medical History:   Diagnosis Date    Hypertension        Past Surgical History:   History reviewed  No pertinent surgical history  Family History:  Family history reviewed and non-contributory  History reviewed  No pertinent family history      Social History:  Social History     Socioeconomic History    Marital status: /Civil Union     Spouse name: None    Number of children: None    Years of education: None    Highest education level: None   Occupational History    None   Social Needs    Financial resource strain: None    Food insecurity:     Worry: None     Inability: None    Transportation needs:     Medical: None     Non-medical: None   Tobacco Use    Smoking status: Never Smoker    Smokeless tobacco: Never Used   Substance and Sexual Activity    Alcohol use: No    Drug use: No    Sexual activity: None   Lifestyle    Physical activity:     Days per week: None Minutes per session: None    Stress: None   Relationships    Social connections:     Talks on phone: None     Gets together: None     Attends [employer] service: None     Active member of club or organization: None     Attends meetings of clubs or organizations: None     Relationship status: None    Intimate partner violence:     Fear of current or ex partner: None     Emotionally abused: None     Physically abused: None     Forced sexual activity: None   Other Topics Concern    None   Social History Narrative    None       Allergies:    Allergies   Allergen Reactions    Lisinopril Angioedema           Labs:  0   Lab Value Date/Time    HCT 42 7 09/24/2019 0455    HCT 45 9 09/23/2019 1259    HCT 40 7 06/08/2018 0430    HGB 13 6 09/24/2019 0455    HGB 14 1 09/23/2019 1259    HGB 13 3 06/08/2018 0430    WBC 6 14 09/24/2019 0455    WBC 6 65 09/23/2019 1259    WBC 14 82 (H) 06/08/2018 0430       Meds:    Current Facility-Administered Medications:     acetaminophen (TYLENOL) tablet 650 mg, 650 mg, Oral, Q6H PRN, Carol Garcai PA-C    cloNIDine (CATAPRES-TTS-1) 0 1 mg/24 hr TD weekly patch, 0 1 mg, Transdermal, Weekly, Hamilton Prechtel, DO, 0 1 mg at 09/23/19 1943    hydrALAZINE (APRESOLINE) injection 10 mg, 10 mg, Intravenous, Q6H PRN, Hamilton Prechtel, DO, 10 mg at 09/23/19 2337    hydrochlorothiazide (HYDRODIURIL) tablet 25 mg, 25 mg, Oral, Daily, Ashley Parsons PA-C, 25 mg at 09/23/19 1400    HYDROmorphone (DILAUDID) injection 0 5 mg, 0 5 mg, Intravenous, Q4H PRN, Hamilton Prechtel, DO, 0 5 mg at 09/23/19 1942    lidocaine (LIDODERM) 5 % patch 1 patch, 1 patch, Topical, Daily, Carol Garcia PA-C, 1 patch at 09/23/19 2115    ondansetron (ZOFRAN) injection 4 mg, 4 mg, Intravenous, Q4H PRN, Hamilton Lozael, DO    oxyCODONE (ROXICODONE) IR tablet 5 mg, 5 mg, Oral, Q4H PRN, Keny Davis PA-C, 5 mg at 09/24/19 0420    traMADol (ULTRAM) tablet 50 mg, 50 mg, Oral, Q6H PRN, Carol Garcia, LILIAN    Physical Exam:   /95 (BP Location: Left arm)   Pulse 61   Temp 98 2 °F (36 8 °C) (Temporal)   Resp 18   Wt 112 kg (245 lb 13 oz)   SpO2 96%   BMI 30 72 kg/m²   Gen: Alert and oriented to person, place, time  HEENT: EOMI, eyes clear, moist mucus membranes, hearing intact  Respiratory: Bilateral chest rise  No audible wheezing found  Cardiovascular: Regular Rate and Rhythm  Abdomen: soft nontender/nondistended  Musculoskeletal: Lumbar Spine:  · Skin intact  No gross deformity  · No erythema ecchymosis or swelling  · No tenderness palpation midline of the lumbar spine  · No tenderness to palpation right SI joint  · Tenderness to palpation noted right paraspinal musculature right oblique  · Motor/sensation intact L2-S1 bilaterally with 5/5 strength  · Mildly positive straight leg raise on the right  · Negative straight leg raise on left  · 2+DP pulse bilaterally  Radiology:   I personally reviewed the films  CT scan of the abdomen and pelvis reveals no acute osseous abnormalities     _*_*_*_*_*_*_*_*_*_*_*_*_*_*_*_*_*_*_*_*_*_*_*_*_*_*_*_*_*_*_*_*_*_*_*_*_*_*_*_*_*    Assessment:  48 y o male with right-sided lumbar spine pain  Likely myofascial     Plan:   · Pain Control  · Recommend IV steroids    · WBAT b/l LEs  · PT/OT  · MRI Lumbar spine ordered by [facility]  · Will follow up after MRI    Nelli Salinas PA-C

## 2019-09-24 NOTE — PHYSICAL THERAPY NOTE
PT EVALUATION  Time In: 1550  Time Out: 602 [address]    46 y o     [id number]    Hypokalemia [E87 6]  Hypertension [I10]  Nausea and vomiting [R11 2]  Flank pain [R10 9]  Intractable low back pain [M54 5]    Past Medical History:   Diagnosis Date    Hypertension          History reviewed  No pertinent surgical history  09/24/19 1625   Pain Assessment   Pain Assessment 0-10   Pain Score 7  (ambulating (s/p medicated))   Pain Type Acute pain   Pain Location Back   Pain Orientation Right   Home Living   Type of [address] [place] [address] Two level   Bathroom Shower/Tub Tub/shower unit   Bathroom Toilet Standard   Prior Function   Level of [place] Independent with ADLs and functional mobility   Lives With Family   ADL Assistance Independent   IADLs Independent   Falls in the last 6 months 0   Vocational Full time employment   Comments Works at [employer] - lifting, bending   Restrictions/Precautions   [place] Bearing Precautions Per Order No  (WBAT per ortho note)   Cognition   Overall Cognitive Status WFL   Arousal/Participation Alert   Orientation Level Oriented X4   Memory Within functional limits   Following Commands Follows all commands and directions without difficulty   RUE Assessment   RUE Assessment WNL   LUE Assessment   LUE Assessment WNL   RLE Assessment   RLE Assessment WNL   LLE Assessment   LLE Assessment WNL   Coordination   Movements are Fluid and Coordinated 1   Sensation WFL   Light Touch   RLE Light Touch Grossly intact   LLE Light Touch Grossly intact   Bed Mobility   Sit to Supine 7  Independent   Additional items Verbal cues  (log rolling)   Transfers   Sit to Stand 7  Independent   Stand to Sit 7  Independent   Ambulation/Elevation   Gait pattern Decreased foot clearance; Short stride; Excessively slow  (guarded trunk; due to pain)   Gait Assistance 5  Supervision   Assistive Device Rolling walker   Distance 10' without AD; 13' with RW   Endurance Deficit   Endurance Deficit No Activity Tolerance   Activity Tolerance Patient limited by pain   Nurse Made Aware RN Lia   Assessment   Prognosis Good   Problem List Decreased mobility   Assessment Pt is a 46year old male admitted to [facility] on 9/23/19 with acute right LBP  CT Abd (-)  MRI LS pending  WBAT per ortho note  PT consulted with eval and treat and activity as tolerated orders  At baseline, Pt is independent for all mobility, ADLs and IADLs  Pt works at a [employer] and needs to lift and bend for work  Pt presents with mobility and gait dysfunction due to LBP  Pt is at supervision level, reaching for walls and furniture without AD  LBP increases with ambulation, but pt with improved tolerance and safety with use of RW  Anticipate return home at d/c  Recommend outpatient PT depending on results of MRI LS  Barriers to Discharge None   Goals   STG Expiration Date 10/04/19   Short Term Goal #1 10 days: 1)  Pt will perform bed mobility via log roll with Mulu demonstrating appropriate technique 100% of the time in order to improve function and tolerance  2)  Perform all transfers with Mulu demonstrating safe and appropriate technique 100% of the time in order to improve ability to negotiate safely in home environment  3) Amb with least restrictive AD > 200'x1 with mod I in order to demonstrate ability to negotiate in home environment  4) Increase activity tolerance to 45 minutes in order to improve endurance to functional tasks  6)  Negotiate stairs using most appropriate technique and S in order to be able to negotiate safely in home environment  7) PT for ongoing patient and family/caregiver education, DME needs and d/c planning in order to promote highest level of function in least restrictive environment     Plan   Treatment/Interventions Bed mobility;Gait training;Equipment eval/education;Patient/family training   PT Frequency   (3-5x/wk)   Recommendation   Recommendation Outpatient PT   Equipment Recommended Walker   PT - OK to Discharge Yes   Additional Comments Home with OP PT    Barthel Index   Feeding 10   Bathing 5   Grooming Score 5   Dressing Score 10   Bladder Score 10   Bowels Score 10   Toilet Use Score 10   Transfers (Bed/Chair) Score 15   Mobility (Level Surface) Score 15   Stairs Score 0   Barthel Index Score 90       History: co - morbidities, use of assistive device, stairs, pain/fall risk  Exam: impairments in locomotion, musculoskeletal, balance,cardiac  Clinical: unstable/unpredictable  Complexity:high    Time In: 1605  Time Out: 1625  Total Time: 20      S:  Pt c/o 7/10 LBP  O:  Gait Training: Pt ambulated 200' with RW and supervision  Pt with slow mera, guarded trunk mobility and decreased step length and height due to pain  A:  Pt activity tolerance and gait limited by LBP  Anticipate return home and recommend outpatient PT (depending on MRI results)  P:  Cont per POC      Brynn Rincon Page, PT

## 2019-09-24 NOTE — PLAN OF CARE
Problem: PHYSICAL THERAPY ADULT  Goal: Performs mobility at highest level of function for planned discharge setting  See evaluation for individualized goals  Description  Treatment/Interventions: Bed mobility, Gait training, Equipment eval/education, Patient/family training  Equipment Recommended: Evelyn Abraham       See flowsheet documentation for full assessment, interventions and recommendations  Note:   Prognosis: Good  Problem List: Decreased mobility  Assessment: Pt is a 46year old male admitted to [place] [address] on 9/23/19 with acute right LBP  CT Abd (-)  MRI LS pending  WBAT per ortho note  PT consulted with eval and treat and activity as tolerated orders  At baseline, Pt is independent for all mobility, ADLs and IADLs  Pt works at a [employer] and needs to lift and bend for work  Pt presents with mobility and gait dysfunction due to LBP  Pt is at supervision level, reaching for walls and furniture without AD  LBP increases with ambulation, but pt with improved tolerance and safety with use of RW  Anticipate return home at d/c  Recommend outpatient PT depending on results of MRI LS  Barriers to Discharge: None     Recommendation: Outpatient PT     PT - OK to Discharge: Yes    See flowsheet documentation for full assessment

## 2019-09-25 ENCOUNTER — APPOINTMENT (INPATIENT)
Dept: MRI IMAGING | Facility: HOSPITAL | Age: 49
DRG: 552 | End: 2019-09-25

## 2019-09-25 VITALS
DIASTOLIC BLOOD PRESSURE: 72 MMHG | OXYGEN SATURATION: 97 % | TEMPERATURE: 97.9 F | SYSTOLIC BLOOD PRESSURE: 133 MMHG | WEIGHT: 245.81 LBS | HEART RATE: 76 BPM | RESPIRATION RATE: 18 BRPM | BODY MASS INDEX: 30.72 KG/M2

## 2019-09-25 PROBLEM — E87.6 HYPOKALEMIA: Status: ACTIVE | Noted: 2019-09-25

## 2019-09-25 PROBLEM — M48.061 FORAMINAL STENOSIS OF LUMBAR REGION: Status: ACTIVE | Noted: 2019-09-25

## 2019-09-25 PROBLEM — K76.9 LIVER LESION: Status: ACTIVE | Noted: 2019-09-25

## 2019-09-25 LAB
ANION GAP SERPL CALCULATED.3IONS-SCNC: 11 MMOL/L (ref 4–13)
BUN SERPL-MCNC: 20 MG/DL (ref 5–25)
CALCIUM SERPL-MCNC: 9.9 MG/DL (ref 8.3–10.1)
CHLORIDE SERPL-SCNC: 101 MMOL/L (ref 100–108)
CO2 SERPL-SCNC: 27 MMOL/L (ref 21–32)
CREAT SERPL-MCNC: 1.25 MG/DL (ref 0.6–1.3)
GFR SERPL CREATININE-BSD FRML MDRD: 77 ML/MIN/1.73SQ M
GLUCOSE SERPL-MCNC: 143 MG/DL (ref 65–140)
MAGNESIUM SERPL-MCNC: 2.2 MG/DL (ref 1.6–2.6)
POTASSIUM SERPL-SCNC: 3.8 MMOL/L (ref 3.5–5.3)
SODIUM SERPL-SCNC: 139 MMOL/L (ref 136–145)

## 2019-09-25 PROCEDURE — 83835 ASSAY OF METANEPHRINES: CPT | Performed by: HOSPITALIST

## 2019-09-25 PROCEDURE — 99232 SBSQ HOSP IP/OBS MODERATE 35: CPT | Performed by: INTERNAL MEDICINE

## 2019-09-25 PROCEDURE — G8989 SELF CARE D/C STATUS: HCPCS

## 2019-09-25 PROCEDURE — 99231 SBSQ HOSP IP/OBS SF/LOW 25: CPT | Performed by: ORTHOPAEDIC SURGERY

## 2019-09-25 PROCEDURE — 83735 ASSAY OF MAGNESIUM: CPT | Performed by: HOSPITALIST

## 2019-09-25 PROCEDURE — 80048 BASIC METABOLIC PNL TOTAL CA: CPT | Performed by: HOSPITALIST

## 2019-09-25 PROCEDURE — 97166 OT EVAL MOD COMPLEX 45 MIN: CPT

## 2019-09-25 PROCEDURE — G8988 SELF CARE GOAL STATUS: HCPCS

## 2019-09-25 PROCEDURE — 72148 MRI LUMBAR SPINE W/O DYE: CPT

## 2019-09-25 PROCEDURE — G8987 SELF CARE CURRENT STATUS: HCPCS

## 2019-09-25 PROCEDURE — 99239 HOSP IP/OBS DSCHRG MGMT >30: CPT | Performed by: HOSPITALIST

## 2019-09-25 RX ORDER — LIDOCAINE 50 MG/G
1 PATCH TOPICAL DAILY
Qty: 30 PATCH | Refills: 0 | Status: SHIPPED | OUTPATIENT
Start: 2019-09-26

## 2019-09-25 RX ORDER — NIFEDIPINE 60 MG/1
60 TABLET, FILM COATED, EXTENDED RELEASE ORAL DAILY
Qty: 30 TABLET | Refills: 0 | Status: CANCELLED | OUTPATIENT
Start: 2019-09-26

## 2019-09-25 RX ORDER — DOCUSATE SODIUM 100 MG/1
100 CAPSULE, LIQUID FILLED ORAL 2 TIMES DAILY
Status: DISCONTINUED | OUTPATIENT
Start: 2019-09-25 | End: 2019-09-25 | Stop reason: HOSPADM

## 2019-09-25 RX ORDER — OXYCODONE HYDROCHLORIDE 5 MG/1
5 TABLET ORAL EVERY 4 HOURS PRN
Qty: 30 TABLET | Refills: 0 | Status: SHIPPED | OUTPATIENT
Start: 2019-09-25 | End: 2019-09-26

## 2019-09-25 RX ORDER — NIFEDIPINE 60 MG/1
60 TABLET, FILM COATED, EXTENDED RELEASE ORAL DAILY
Qty: 30 TABLET | Refills: 0 | Status: SHIPPED | OUTPATIENT
Start: 2019-09-26

## 2019-09-25 RX ORDER — TIZANIDINE 4 MG/1
4 TABLET ORAL EVERY 8 HOURS PRN
Qty: 60 TABLET | Refills: 0 | Status: SHIPPED | OUTPATIENT
Start: 2019-09-25

## 2019-09-25 RX ORDER — TRAMADOL HYDROCHLORIDE 50 MG/1
50 TABLET ORAL EVERY 6 HOURS PRN
Qty: 30 TABLET | Refills: 0 | Status: CANCELLED | OUTPATIENT
Start: 2019-09-25 | End: 2019-10-05

## 2019-09-25 RX ORDER — SPIRONOLACTONE 50 MG/1
50 TABLET, FILM COATED ORAL 2 TIMES DAILY
Qty: 60 TABLET | Refills: 0 | Status: SHIPPED | OUTPATIENT
Start: 2019-09-25

## 2019-09-25 RX ADMIN — METHYLPREDNISOLONE SODIUM SUCCINATE 60 MG: 125 INJECTION, POWDER, FOR SOLUTION INTRAMUSCULAR; INTRAVENOUS at 16:11

## 2019-09-25 RX ADMIN — METHYLPREDNISOLONE SODIUM SUCCINATE 60 MG: 125 INJECTION, POWDER, FOR SOLUTION INTRAMUSCULAR; INTRAVENOUS at 09:08

## 2019-09-25 RX ADMIN — OXYCODONE HYDROCHLORIDE 5 MG: 5 TABLET ORAL at 12:01

## 2019-09-25 RX ADMIN — OXYCODONE HYDROCHLORIDE 5 MG: 5 TABLET ORAL at 16:11

## 2019-09-25 RX ADMIN — LIDOCAINE 1 PATCH: 50 PATCH CUTANEOUS at 09:09

## 2019-09-25 RX ADMIN — HYDROMORPHONE HYDROCHLORIDE 0.5 MG: 1 INJECTION, SOLUTION INTRAMUSCULAR; INTRAVENOUS; SUBCUTANEOUS at 09:04

## 2019-09-25 RX ADMIN — NIFEDIPINE 60 MG: 30 TABLET, FILM COATED, EXTENDED RELEASE ORAL at 09:07

## 2019-09-25 RX ADMIN — HYDROCHLOROTHIAZIDE 25 MG: 25 TABLET ORAL at 09:07

## 2019-09-25 RX ADMIN — DOCUSATE SODIUM 100 MG: 100 CAPSULE, LIQUID FILLED ORAL at 12:01

## 2019-09-25 RX ADMIN — POTASSIUM CHLORIDE 40 MEQ: 1500 TABLET, EXTENDED RELEASE ORAL at 09:07

## 2019-09-25 RX ADMIN — METHYLPREDNISOLONE SODIUM SUCCINATE 60 MG: 125 INJECTION, POWDER, FOR SOLUTION INTRAMUSCULAR; INTRAVENOUS at 02:45

## 2019-09-25 RX ADMIN — OXYCODONE HYDROCHLORIDE 5 MG: 5 TABLET ORAL at 02:44

## 2019-09-25 RX ADMIN — SPIRONOLACTONE 50 MG: 50 TABLET ORAL at 09:08

## 2019-09-25 NOTE — ASSESSMENT & PLAN NOTE
Noted on  MRI of L5 spine w/severe stenosis and impingement of nerve   -Also noted Left foraminal/extra foraminal disc protrusion with annular fissure is also noted at L3-L4 with near abutment of the extraforaminal portion of the left L3 nerve root    -pt w/intermittent s/sx of radiculopathy but pain has not been worsening and there is no numbness or alarm s/sx of cauda equina nor is there central canal stenosis  -ambulatory referral to spine surgery for surveillance and mgmt

## 2019-09-25 NOTE — ASSESSMENT & PLAN NOTE
Incidental finding of an enhancing lesion within the right hepatic lobe measuring 4 3 x 3 2 cm on ct a/p in pt w/o drug or significant alcohol hx  No ct imaging suggestive of hepatic steatosis  Op mri with contrast liver phase cc results to pcp  Pending on findings may need afp testing and gi referral at pcp discretion

## 2019-09-25 NOTE — DISCHARGE SUMMARY
Discharge- [facility] 1970, 46 y o  male MRN: [id number]    Unit/Bed#: E5 -01 Encounter: [id number]    Primary Care Provider: Raul Yuan DO   Date and time admitted to hospital: 9/23/2019 11:47 AM      Discharging Physician / Practitioner: Brady Cleary PA-C  PCP: Raul Yuan DO  Admission Date:   Admission Orders (From admission, onward)     Ordered        09/23/19 1733  Inpatient Admission (expected length of stay for this patient Order details is greater than two midnights)  Once                   Discharge Date: 09/25/19    Resolved Problems  Date Reviewed: 9/25/2019    None          Consultations During Hospital Stay:  · Orthopaedics  · nephrology    Procedures Performed:   ·     Significant Findings / Test Results:   · MRI lumbar spine wo contrast  Multilevel degenerative changes of the lumbar spine, as described above      Severe left foraminal narrowing with impingement of the exiting left L5 nerve root is present at L5-S1      Left foraminal/extra foraminal disc protrusion with annular fissure is also noted at L3-L4 with near abutment of the extraforaminal portion of the left L3 nerve root  CTA C/A/P w w/o contrast   No evidence for aortic dissection      Redemonstration of a hypodensity within the upper pole the left kidney with adjacent stranding is identified on noncontrast CT performed earlier the same day  Differential diagnosis should include an area of focal bacterial pyelonephritis versus   possibly a cyst with rupture  Correlate with urinalysis      Indeterminate enhancing lesion within the right hepatic lobe, as described above  Correlation with nonemergent contrast-enhanced MRI of the abdomen utilizing liver protocol is recommended for further evaluation  Incidental Findings:   · Liver lesion 4 3 by 3 2cm right hepatic lobe w/enhancement     Test Results Pending at Discharge (will require follow up):    · Renin/aldosterone  · metanephrines     Outpatient Tests Requested:  · Mri liver phase  · Repeat bmp in one week    Complications:  none    Reason for Admission: intractable back pain/htn urgency    Hospital Course:     Nelson Goodell is a 46 y o  male patient who originally presented to the hospital on 9/23/2019 with acute right flank pain without radiation  This occurred while in the shower  He had difficulty bearing weight on the right leg due to this back pain  No urinary problems  no blood seen in urine  He doesn't remember any trauma to his back  He has never had this problem before  He doesn't remember any tearing sensation nor "popping" sounds in his back  No acute findings were found on CT abdomen  UA was negative  IV morpine has not helped his pain  He says that it is still 10/10  No loss of bowel or bladder control  No leg weakness  He was treated for htn urgency and acute low back pain as noted below by problem list       Hypokalemia  Assessment & Plan  Improved  Felt to be likely due to hctz rather than 2* adrenal pathology  Off all supplementation with use of hctz/spironolactone  Repeat bmp in 1 week per nephrology    Foraminal stenosis of lumbar region  Assessment & Plan  Noted on  MRI of L5 spine w/severe stenosis and impingement of nerve   -Also noted Left foraminal/extra foraminal disc protrusion with annular fissure is also noted at L3-L4 with near abutment of the extraforaminal portion of the left L3 nerve root    -pt w/intermittent s/sx of radiculopathy but pain has not been worsening and there is no numbness or alarm s/sx of cauda equina nor is there central canal stenosis  -ambulatory referral to spine surgery for surveillance and mgmt      Liver lesion  Assessment & Plan  Incidental finding of an enhancing lesion within the right hepatic lobe measuring 4 3 x 3 2 cm on ct a/p in pt w/o drug or significant alcohol hx  No ct imaging suggestive of hepatic steatosis  Op mri with contrast liver phase cc results to pcp  Pending on findings may need afp testing and gi referral at pcp discretion    Hypertensive urgency  Assessment & Plan  Likely multifactorial from essential htn, pain  [place] to be less likely 2* secondary htn  -Consideration for APOL-1 gene mutation per nephrology as well  -This has improved to 150s and is acceptable by nephrology for d/c  -Pt previously was on hctz and norvasc  -He has been placed on hctz 25mg po daily, nifedpine 60mg po daily, spironolactone 50mg bid per nephrology  -Renin/aldosterone, metanephrines are pending for 2* htn workup     -safe for d/c per nephrology w/2 week f/u and repeat bmp to assess renal fn potassium      * Acute low back pain  Assessment & Plan  Acute new onset  Likely myofascial pain  This is right nonradiating lateral back pain w/no mid spine pain, or radicular features  -Improving w/steroids, zanaflex, narcotics ice/heat  Not a candidate for nsaids due to severity of htn  -CT abd/pelvis negative for acute intra-abdominal retroperitoneal pathology  -MRI lumbar spine demonstrates no acute pathology affecting right back but does have significant left sided findings noted below  -No aortic dissection  -Appreciate ortho assistance  I will add solumedrol per their rec's in case it is myofascial pain   -Ambulatory referral to pt  Educated pt on time frame for symptom improvement and realistic expectation  -pdmp reviewed w/no red flags  Rx'd short course of oxycodone, zanaflex for symptom control                  Please see above list of diagnoses and related plan for additional information  Condition at Discharge: good     Discharge Day Visit / Exam:     Subjective:  Pt seen/examined  Back pain stable modestly improved  nonradiating and stable at right low back  No numbness/weakness groin numbness or bowel incontinence or urinary incontinence  No cp/sob  Does workout regularly but has had no other back pain injuries      Reports intermittent radicular pain down left leg but none recently associated with this  Vitals: Blood Pressure: 133/72 (09/25/19 1500)  Pulse: 76 (09/25/19 1500)  Temperature: 97 9 °F (36 6 °C) (09/25/19 1500)  Temp Source: Temporal (09/25/19 1500)  Respirations: 18 (09/25/19 1500)  Weight - Scale: 112 kg (245 lb 13 oz) (09/23/19 1143)  SpO2: 97 % (09/25/19 1500)  Exam:   Physical Exam   Constitutional: He appears well-developed and well-nourished  No distress  HENT:   Head: Normocephalic and atraumatic  Right Ear: External ear normal    Left Ear: External ear normal    Mouth/Throat: Oropharynx is clear and moist    Eyes: Conjunctivae are normal    Neck: Normal range of motion  Cardiovascular: Normal rate, regular rhythm and normal heart sounds  Exam reveals no gallop and no friction rub  No murmur heard  Pulmonary/Chest: Effort normal  No respiratory distress  He has no wheezes  He has no rales  Abdominal: Soft  He exhibits no distension and no mass  There is no tenderness  There is no rebound and no guarding  Musculoskeletal: He exhibits tenderness (back pain w/palpation of right lateral back from L1-L5/S1  no midline tenderness to palpation)  He exhibits no edema  Neurological: He is alert  Skin: Skin is warm and dry  He is not diaphoretic  Psychiatric: He has a normal mood and affect  Vitals reviewed  (  Be Sure to Include Physical Exam: Delete this entire line when you have entered your exam)  Discussion with Family: none    Discharge instructions/Information to patient and family:   See after visit summary for information provided to patient and family  Provisions for Follow-Up Care:  See after visit summary for information related to follow-up care and any pertinent home health orders        Disposition:     Home    For Discharges to [facility] SNF:   · Not Applicable to this Patient - Not Applicable to this Patient    Planned Readmission: none     Discharge Statement:  I spent 45 minutes discharging the patient  This time was spent on the day of discharge  I had direct contact with the patient on the day of discharge  Greater than 50% of the total time was spent examining patient, answering all patient questions, arranging and discussing plan of care with patient as well as directly providing post-discharge instructions  Additional time then spent on discharge activities  Discharge Medications:  See after visit summary for reconciled discharge medications provided to patient and family        ** Please Note: This note has been constructed using a voice recognition system **

## 2019-09-25 NOTE — OCCUPATIONAL THERAPY NOTE
OccupationalTherapy Evaluation     Patient Name: Moncho Vance  HKHRI'G Date: 9/25/2019  Problem List  Principal Problem:    Acute low back pain  Active Problems:    Hypertensive urgency    Past Medical History  Past Medical History:   Diagnosis Date    Hypertension      Past Surgical History  History reviewed  No pertinent surgical history  09/25/19 0828   Note Type   Note type Eval only   Restrictions/Precautions   Weight Bearing Precautions Per Order Yes   RLE Weight Bearing Per Order WBAT  (per ortho consult)   LLE Weight Bearing Per Order WBAT  (per ortho consult)   Other Precautions Pain   Pain Assessment   Pain Assessment 0-10   Pain Score 9   Pain Location Back   Pain Orientation Lower;Right   Hospital Pain Intervention(s) Medication (See MAR); Repositioned; Ambulation/increased activity; Emotional support; Rest  Rafiq Wilburn RN, notified)   Response to Interventions Tolerated OT   Multiple Pain Sites No   Home Living   Type of [address] [place] [address] Two level;Stairs to enter with rails  (3+4 QUAN)   Bathroom Shower/Tub Tub/shower unit   Bathroom Toilet Standard   Bathroom Equipment Other (Comment)  (no DME)   Bathroom Accessibility Accessible  (2nd floor only)   Home Equipment Other (Comment)  (no DME)   Additional Comments Pt lives with family in a two level house with 3+4 QUAN and FOS to 2nd floor bedroom/bathroom  Prior Function   Level of [place] Independent with ADLs and functional mobility   Lives With [facility] Help From Family   ADL Assistance Independent   IADLs Independent   Falls in the last 6 months 0   Vocational Full time employment   Comments At baseline, pt was I w/ ADLs, IADLs, and functional mobility/transfers w/o use of AD, (+) , and reports 0 falls PTA  Lifestyle   Autonomy At baseline, pt was I w/ ADLs, IADLs, and functional mobility/transfers w/o use of AD, (+) , and reports 0 falls PTA     Reciprocal Relationships Lives with family   Service to Others FT employed- [employer] Spending time with family   Psychosocial   Psychosocial (WDL) WDL   ADL   Where Assessed Edge of bed   Bed Mobility   Supine to Sit 7  Independent  (log roll technique)   Sit to Supine 7  Independent  (log roll technique)   Additional Comments Pt lying supine at end of session with call bell and phone within reach  All needs met and pt reports no further questions for OT at this time   Transfers   Sit to Stand 7  Independent   Stand to Sit 7  Independent   Additional Comments Good safety awareness demonstrated   Functional Mobility   Functional Mobility 5  Supervision   Additional Comments w/o use of AD; No LOBs noted   Balance   Static Sitting Normal   Dynamic Sitting Good   Static Standing Fair +   Dynamic Standing Fair   Ambulatory Fair   Activity Tolerance   Activity Tolerance Patient limited by pain   Medical Staff Made Caitie Leong, RN   Nurse Made Aware yes   RUE Assessment   RUE Assessment WNL   LUE Assessment   LUE Assessment WNL   Hand Function   Gross Motor Coordination Functional   Fine Motor Coordination Functional   Sensation   Light Touch No apparent deficits   Proprioception   Proprioception No apparent deficits   Vision-Basic Assessment   Current Vision No visual deficits   Vision - Complex Assessment   Ocular Range of Motion WFL   Acuity Able to read clock/calendar on wall without difficulty   Perception   Inattention/Neglect Appears intact   Cognition   Overall Cognitive Status [facility]   Arousal/Participation Alert; Cooperative   Attention Within functional limits   Orientation Level Oriented X4   Memory Within functional limits   Following Commands Follows all commands and directions without difficulty   Comments Pt pleasant and cooperative; Engages in conversation appropriately   Assessment   Prognosis Good   Assessment Pt is a 46 y o  male seen for OT evaluation s/p adm [facility]2019 w/ acute R low back/flank pain  Per Ortho consult, pt WBAT B/L LEs  Comorbidities affecting pts functional performance include a significant PMH of HTN  Pt with active OT orders and activity orders for Activity as tolerated  Pt lives with family in a two level house with 3+4 QUAN and FOS to 2nd floor bedroom/bathroom  At baseline, pt was I w/ ADLs, IADLs, and functional mobility/transfers w/o use of AD, (+) , and reports 0 falls PTA  Upon evaluation, pt currently functioning at a  [address] for overall ADLs, Independent for transfers, and Supervision for functional mobility 2* the following deficits impacting occupational performance: increased pain  These impairments, however do not limit pts ability to safely engage in all baseline areas of occupation, as pt is functioning near baseline level of performance and reports no concerns regarding returning home and performing ADLs  Pt scored overall 85/100 on the Barthel Index  Based on the aforementioned OT evaluation, functional performance deficits, and assessments, pt has been identified as a Moderate complexity evaluation  No further acute OT needs identified at this time  Recommend continued mobilization with hospital staff and restorative services while in the hospital to increase pts endurance and strength upon D/C  From OT standpoint, recommend D/C home with family support when medically cleared  D/C pt from OT caseload at this time      Goals   Patient Goals To have less pain   Plan   OT Frequency Eval only   Recommendation   OT Discharge Recommendation Home with family support  (Continued PT)   OT - OK to Discharge Yes  (when medically cleared)   Barthel Index   Feeding 10   Bathing 5   Grooming Score 5   Dressing Score 10   Bladder Score 10   Bowels Score 10   Toilet Use Score 10   Transfers (Bed/Chair) Score 15   Mobility (Level Surface) Score 10   Stairs Score 0  (not tested on eval)   Barthel Index Score 85   Modified Ellis Scale   Modified Ellis Scale 1 Bernadine Sanabria, OTR/L

## 2019-09-25 NOTE — ASSESSMENT & PLAN NOTE
Improved    Felt to be likely due to hctz rather than 2* adrenal pathology  Off all supplementation with use of hctz/spironolactone  Repeat bmp in 1 week per nephrology

## 2019-09-25 NOTE — PROGRESS NOTES
Progress Note - Orthopedics   Judi Chan Race 46 y o  male MRN: [id number]  Unit/Bed#: E5 -01 Encounter: [id number]    Assessment:  L4-5, L5-S1 disc protrusion, R sided low back pain, possible sacroiliitis    Plan:  Pain control prn  PT  F/u with neurosurgery, can consider pain mgt follow up as well- discussed with patient    Subjective: patient seen and examined  Admits R sided low back pain  Admits occasional pain radiating down left leg  No other complaints  Pain has improved since admission  Vitals: Blood pressure 133/72, pulse 76, temperature 97 9 °F (36 6 °C), temperature source Temporal, resp  rate 18, weight 112 kg (245 lb 13 oz), SpO2 97 %  ,Body mass index is 30 72 kg/m²        Intake/Output Summary (Last 24 hours) at 9/25/2019 1750  Last data filed at 9/25/2019 0845  Gross per 24 hour   Intake 480 ml   Output 730 ml   Net -250 ml       Invasive Devices     Peripheral Intravenous Line            Peripheral IV 09/23/19 Left Forearm 2 days                Ortho Exam:   RLE:  EHL/AT/GS/quads/hamstrings/iliopsoas 5/5, sensation grossly intact L4, L5, S1, palpable pedal pulse  LLE:  EHL/AT/GS/quads/hamstrings/iliopsoas 5/5, sensation grossly intact L4, L5, S1, palpable pedal pulse  Back:  Mild TTP over right sided paraspinal musculature  Mild TTP over R SI joint    Lab, Imaging and other studies:   CBC: No results found for: WBC, HGB, HCT, MCV, PLT, ADJUSTEDWBC, MCH, MCHC, RDW, MPV, NRBC  CMP:   Lab Results   Component Value Date    SODIUM 139 09/25/2019     09/25/2019    CO2 27 09/25/2019    BUN 20 09/25/2019    CREATININE 1 25 09/25/2019    CALCIUM 9 9 09/25/2019    EGFR 77 09/25/2019        I personally reviewed MRI lumbar spine:  Multilevel degenerative changes, L4-5 disc protrusion with moderate to severe foraminal narrowing on left side and mild foraminal narrowing on right side, L5-S1 disc protrusion with mild to moderate foraminal narrowing left side

## 2019-09-25 NOTE — PROGRESS NOTES
Follow up Consultation    Nephrology   Matthias Duong 46 y o  male MRN: [id number]  Unit/Bed#: E5 -01 Encounter: [id number]      Physician Requesting Consult: Jennifer Day DO  Reason for Consult:  Hypertension and hypokalemia      ASSESSMENT/PLAN:  46 y o   male with pmh of hypertension for over 20 years who was admitted for acute right flank pain  Nephrology has been consulted for evaluation and management of elevated blood pressures and hypokalemia     1) hypertension:  - likely has underlying essential hypertension  - however could have some component of APOL-1 gene mutation which is known in  [place] for causing elevated blood pressures  - some degree of hypertension is secondary to his pain component as well as use of NSAIDs   - unlikely to be secondary hypertension  - CT abdomen from 09/23/2019 shows no adrenal abnormality or renal abnormality   -  likely would for secondary hypertension very low however workup for renin, aldosterone, plasma metanephrine levels  Was sent and will be followed outpatient  - patient has a history of angioedema secondary to lisinopril   - discussed side effects of Aldactone with the patient  He he is willing to rechallenge and started  - his hypokalemia is likely secondary to his HCTZ use   -adjusted patient's regimen on 09/24/2019 and placed him on a regimen including HCTZ 25 mg p o  Q day, Aldactone 50 mg p o  B i d , Procardia XL 60 mg p o  Q day and for now hydralazine 25 mg p o  Q 8 p r n  If SBP greater than 160     - thus far he appears to be responding well  - he will need close follow-up upon discharge for his blood pressure management  - advised him for risk for CKD in the future due to uncontrolled hypertension   - After review of records it appears that the patient has a baseline Creatinine of 1 2-1 4mg/dL  - patient was admitted with a creatinine of 0 98 mg/dL  - patient's creatinine today is at 1 25 mg/dL    - clinically patient appears to be euvolemic  - Avoid nephrotoxins, adjust meds to appropriate GFR   - Acid base and lytes stable   - Optimize hemodynamic status to avoid delay in renal recovery  - check BMP, magnesium, phosphorus in a m   - Strict I/O   - advise patient is stable for discharge from renal standpoint and will have office call him to set up an appointment to be seen by me in 2 weeks and blood work in 1 week to follow up his renal parameters and electrolytes  Advised him to stay away from NSAIDs and to follow low-salt diet     3)H/H:  - most recent hemoglobin at 13 6 grams/deciliter  - maintain hemoglobin greater than 8 grams/deciliter     4)Volume status:  - Clinically patient appears to be euvolemic      5) hypokalemia:  -improved, most recent potassium at 3 8  - Continue to monitor for now  - recheck BMP in a m     6) back pain:  - MRI from 09/25 showing multilevel degenerative changes of the lumbar spine, severe left foraminal narrowing with impingement of the exiting left L5 nerve root is present at L5-S1  Left foraminal/extra foraminal disc protrusion with annular fissure also noted at L3-L4  - management per primary team  - follow-up with ortho    Thanks for the consult  Will continue to follow  Please call with questions/ concerns  Above-mentioned orders and Plan with regards to his hypertension was discussed with the team in depth, stable from Nephrology standpoint for discharge  Arrangements for follow-up made  Naren Dwyer MD, [facility], 9/25/2019, 12:40 PM              Objective :   Hemodynamically stable blood pressures have been around SBP is in the 160s to 140 range heart rate improved urine output close to 430 cc documented last 24 hours  Remains afebrile  Status post MRI spine showing multilevel degenerative changes, patient was seen and examined in his room appeared happy that everything is stable and that he would have close follow-up discharge        PHYSICAL EXAM  /84 (BP Location: Right arm) Pulse 61   Temp 97 8 °F (36 6 °C) (Temporal)   Resp 18   Wt 112 kg (245 lb 13 oz)   SpO2 97%   BMI 30 72 kg/m²   Temp (24hrs), Av °F (36 7 °C), Min:97 6 °F (36 4 °C), Max:98 7 °F (37 1 °C)        Intake/Output Summary (Last 24 hours) at 2019 1240  Last data filed at 2019 0845  Gross per 24 hour   Intake 960 ml   Output 730 ml   Net 230 ml       I/O last 24 hours: In: 1200 [P O :1200]  Out: 730 [Urine:730]      Current Weight: Weight - Scale: 112 kg (245 lb 13 oz)  First Weight: Weight - Scale: 112 kg (245 lb 13 oz)  Physical Exam   Constitutional: He is oriented to person, place, and time  He appears well-developed and well-nourished  No distress  HENT:   Head: Normocephalic and atraumatic  Mouth/Throat: Oropharynx is clear and moist  No oropharyngeal exudate  Eyes: Conjunctivae are normal  No scleral icterus  Neck: Neck supple  No JVD present  Cardiovascular: Normal heart sounds  Exam reveals no friction rub  Pulmonary/Chest: Effort normal  He has no wheezes  He has no rales  Abdominal: Soft  He exhibits no mass  There is no tenderness  Musculoskeletal: He exhibits no edema  Neurological: He is alert and oriented to person, place, and time  Skin: Skin is warm  He is not diaphoretic  No pallor  Psychiatric: He has a normal mood and affect  His behavior is normal    Nursing note and vitals reviewed  Review of Systems   Constitutional: Negative for appetite change, fatigue and fever  HENT: Negative for congestion  Respiratory: Negative for cough, shortness of breath and wheezing  Cardiovascular: Negative for palpitations and leg swelling  Gastrointestinal: Negative for abdominal pain, constipation, diarrhea, nausea and vomiting  Genitourinary: Negative for difficulty urinating and dysuria  Musculoskeletal: Positive for back pain  Skin: Negative for rash  Neurological: Negative for dizziness and headaches     Psychiatric/Behavioral: Negative for confusion  All other systems reviewed and are negative  Scheduled Meds:  Current Facility-Administered Medications:  acetaminophen 650 mg Oral Q6H PRN Paloma Howell PA-C   docusate sodium 100 mg Oral BID Jose Miguel Oneida, DO   hydrALAZINE 25 mg Oral Q8H PRN Anselmo Ormond, MD   hydrochlorothiazide 25 mg Oral Daily Aly Briggs PA-C   HYDROmorphone 0 5 mg Intravenous Q4H PRN Jose Miguel Dent, DO   lidocaine 1 patch Topical Daily Harrison Davis PA-C   methylPREDNISolone sodium succinate 60 mg Intravenous Q6H [facility], DO   NIFEdipine 60 mg Oral Daily Anselmo Ormond, MD   ondansetron 4 mg Intravenous Q4H PRN Jose Miguel Dent, DO   oxyCODONE 5 mg Oral Q4H PRN Paloma Howell PA-C   spironolactone 50 mg Oral BID Anselmo Ormond, MD   tiZANidine 4 mg Oral Q8H PRN Jose Miguel Oneida, DO   traMADol 50 mg Oral Q6H PRN Paloma Howell PA-C       PRN Meds:   acetaminophen    hydrALAZINE    HYDROmorphone    ondansetron    oxyCODONE    tiZANidine    traMADol    Continuous Infusions:       Invasive Devices: Invasive Devices     Peripheral Intravenous Line            Peripheral IV 09/23/19 Left Forearm 2 days                  LABORATORY:    Results from last 7 days   Lab Units 09/25/19  0610 09/24/19  0455 09/23/19  1259   WBC Thousand/uL  --  6 14 6 65   HEMOGLOBIN g/dL  --  13 6 14 1   HEMATOCRIT %  --  42 7 45 9   PLATELETS Thousands/uL  --  195 201   POTASSIUM mmol/L 3 8 2 7* 2 9*   CHLORIDE mmol/L 101 101 102   CO2 mmol/L 27 28 33*   BUN mg/dL 20 9 7   CREATININE mg/dL 1 25 1 08 0 98   CALCIUM mg/dL 9 9 8 9 9 1   MAGNESIUM mg/dL 2 2 1 7  --       rest all reviewed    RADIOLOGY:  MRI lumbar spine wo contrast   Final Result by Charley Bray MD (09/25 1117)      Multilevel degenerative changes of the lumbar spine, as described above  Severe left foraminal narrowing with impingement of the exiting left L5 nerve root is present at L5-S1        Left foraminal/extra foraminal disc protrusion with annular fissure is also noted at L3-L4 with near abutment of the extraforaminal portion of the left L3 nerve root  Workstation performed: [id number]         CTA dissection protocol chest abdomen pelvis w wo contrast   Final Result by Rafael Simental MD (09/23 1645)      No evidence for aortic dissection  Redemonstration of a hypodensity within the upper pole the left kidney with adjacent stranding is identified on noncontrast CT performed earlier the same day  Differential diagnosis should include an area of focal bacterial pyelonephritis versus    possibly a cyst with rupture  Correlate with urinalysis  Indeterminate enhancing lesion within the right hepatic lobe, as described above  Correlation with nonemergent contrast-enhanced MRI of the abdomen utilizing liver protocol is recommended for further evaluation  Workstation performed: [id number]         CT renal stone study abdomen pelvis without contrast   Final Result by Jenaro Curry MD (09/23 1321)      No urinary tract calculus  No hydronephrosis  Nonspecific inflammatory type stranding in the focal anterolateral left perinephric fat, of uncertain etiology  In the appropriate clinical setting, this can be seen in patients with history of unilateral left pyelonephritis  Other etiologies for this    indeterminate finding are not excluded  If no clinical explanation for this finding, which is of questionable clinical significance, can be found, consider nonemergent follow-up noncontrast and contrast-enhanced renal protocol CT to exclude the unlikely    possibility that there is a left renal mass  Workstation performed: [id number]           Rest all reviewed    Portions of the record may have been created with voice recognition software  Occasional wrong word or "sound a like" substitutions may have occurred due to the inherent limitations of voice recognition software   Read the chart carefully and recognize, using context, where substitutions have occurred  If you have any questions, please contact the dictating provider

## 2019-09-25 NOTE — ASSESSMENT & PLAN NOTE
Likely multifactorial from essential htn, pain    [place] to be less likely 2* secondary htn  -Consideration for APOL-1 gene mutation per nephrology as well  -This has improved to 150s and is acceptable by nephrology for d/c  -Pt previously was on hctz and norvasc  -He has been placed on hctz 25mg po daily, nifedpine 60mg po daily, spironolactone 50mg bid per nephrology  -Renin/aldosterone, metanephrines are pending for 2* htn workup     -safe for d/c per nephrology w/2 week f/u and repeat bmp to assess renal fn potassium

## 2019-09-25 NOTE — ASSESSMENT & PLAN NOTE
Acute new onset  Likely myofascial pain  This is right nonradiating lateral back pain w/no mid spine pain, or radicular features  -Improving w/steroids, zanaflex, narcotics ice/heat  Not a candidate for nsaids due to severity of htn  -CT abd/pelvis negative for acute intra-abdominal retroperitoneal pathology  -MRI lumbar spine demonstrates no acute pathology affecting right back but does have significant left sided findings noted below  -No aortic dissection  -Appreciate ortho assistance  I will add solumedrol per their rec's in case it is myofascial pain   -Ambulatory referral to pt  Educated pt on time frame for symptom improvement and realistic expectation  -pdmp reviewed w/no red flags   Rx'd short course of oxycodone, zanaflex for symptom control

## 2019-09-25 NOTE — DISCHARGE INSTRUCTIONS
Liver lesion- this is unclear whether or not this may be a cyst which is a benign or noncancerous fluid filled sac vs a benign tumor or other  Please follow up with an MRI of your liver and discuss the results with your primary care provider  You were evaluated for your hypertension and started on several new medications  Dr Nay Puckett will follow up with you in 2 weeks and will recheck your kidney and potassium levels in one weeks time  Please avoid motrin/alleve advil ibuprofen  You were evaluated for right back pain which was felt to be due to myofascial pain and will require 6-8 weeks of recovery with pt  You will be prescribed a short course of oxycodone but typically symptoms are managed with supportive care with tylenol, muscle relaxers, ice/heat  You were found to have a left sided herniated disc which is near the left L3 nerve root  You were also found to have severe narrowing at the L5 nerve root called 'foraminal stenosis '  Given your intermittent sciatica symptoms and concern this may be impinging the nerve you have been referred to a neurosurgery team       Acute Low Back Pain   WHAT YOU NEED TO KNOW:   Acute low back pain is sudden discomfort in your lower back area that lasts for up to 6 weeks  The discomfort makes it difficult to tolerate activity  DISCHARGE INSTRUCTIONS:   Seek care immediately or call 911 if:   · You have severe pain  · You have sudden stiffness and heaviness on both buttocks down to both legs  · You have numbness or weakness in one leg, or pain in both legs  · You have numbness in your genital area or across your lower back  · You cannot control your urine or bowel movements  Contact your healthcare provider if:   · You have a fever  · You have pain at night or when you rest     · Your pain does not get better with treatment  · You have pain that worsens when you cough or sneeze      · You suddenly feel something pop or snap in your back     · You have questions or concerns about your condition or care  Medicines: The following medicines may be ordered by your healthcare provider:  · Acetaminophen  decreases pain  It is available without a doctor's order  Ask how much to take and how often to take it  Follow directions  Acetaminophen can cause liver damage if not taken correctly  · NSAIDs  help decrease swelling and pain  This medicine is available with or without a doctor's order  NSAIDs can cause stomach bleeding or kidney problems in certain people  If you take blood thinner medicine, always ask your healthcare provider if NSAIDs are safe for you  Always read the medicine label and follow directions  · Prescription pain medicine  may be given  Ask your healthcare provider how to take this medicine safely  · Muscle relaxers  decrease pain by relaxing the muscles in your lower spine  · Take your medicine as directed  Contact your healthcare provider if you think your medicine is not helping or if you have side effects  Tell him of her if you are allergic to any medicine  Keep a list of the medicines, vitamins, and herbs you take  Include the amounts, and when and why you take them  Bring the list or the pill bottles to follow-up visits  Carry your medicine list with you in case of an emergency  Self-care:   · Stay active  as much as you can without causing more pain  Bed rest could make your back pain worse  Start with some light exercises such as walking  Avoid heavy lifting until your pain is gone  Ask for more information about the activities or exercises that are right for you  · Ice  helps decrease swelling, pain, and muscle spams  Put crushed ice in a plastic bag  Cover it with a towel  Place it on your lower back for 20 to 30 minutes every 2 hours  Do this for about 2 to 3 days after your pain starts, or as directed  · Heat  helps decrease pain and muscle spasms   Start to use heat after treatment with ice has stopped  Use a small towel dampened with warm water or a heating pad, or sit in a warm bath  Apply heat on the area for 20 to 30 minutes every 2 hours for as many days as directed  Alternate heat and ice  Prevent acute low back pain:   · Use proper body mechanics  ¨ Bend at the hips and knees when you  objects  Do not bend from the waist  Use your leg muscles as you lift the load  Do not use your back  Keep the object close to your chest as you lift it  Try not to twist or lift anything above your waist     ¨ Change your position often when you stand for long periods of time  Rest one foot on a small box or footrest, and then switch to the other foot often  ¨ Try not to sit for long periods of time  When you do, sit in a straight-backed chair with your feet flat on the floor  Never reach, pull, or push while you are sitting  · Do exercises that strengthen your back muscles  Warm up before you exercise  Ask your healthcare provider the best exercises for you  · Maintain a healthy weight  Ask your healthcare provider how much you should weigh  Ask him to help you create a weight loss plan if you are overweight  Follow up with your healthcare provider as directed:  Return for a follow-up visit if you still have pain after 1 to 3 weeks of treatment  You may need to visit an orthopedist if your back pain lasts more than 6 to 12 weeks  Write down your questions so you remember to ask them during your visits  © 2017 [employer] Information is for End User's use only and may not be sold, redistributed or otherwise used for commercial purposes  All illustrations and images included in CareNotes® are the copyrighted property of A [employer]  or [employer]  The above information is an  only  It is not intended as medical advice for individual conditions or treatments   Talk to your doctor, nurse or pharmacist before following any medical regimen to see if it is safe and effective for you

## 2019-09-26 RX ORDER — OXYCODONE HYDROCHLORIDE 5 MG/1
5 TABLET ORAL EVERY 4 HOURS PRN
Qty: 30 TABLET | Refills: 0 | Status: SHIPPED | OUTPATIENT
Start: 2019-09-26 | End: 2019-10-06

## 2019-09-27 LAB — ALDOST SERPL-MCNC: 5.2 NG/DL (ref 0–30)

## 2019-09-29 LAB — RENIN PLAS-CCNC: <0.167 NG/ML/HR (ref 0.17–5.38)

## 2019-09-30 LAB
METANEPH FREE SERPL-MCNC: <10 PG/ML (ref 0–62)
NORMETANEPHRINE SERPL-MCNC: 47 PG/ML (ref 0–145)

## 2019-10-01 ENCOUNTER — TELEPHONE (OUTPATIENT)
Dept: NEPHROLOGY | Facility: CLINIC | Age: 49
End: 2019-10-01

## 2019-10-02 NOTE — TELEPHONE ENCOUNTER
Spoke with the patient he is scheduled with Lisy Chou on 10/30  I offered him earlier appointments but he couldn't take them because of his childrens school schedule

## 2019-10-29 ENCOUNTER — TELEPHONE (OUTPATIENT)
Dept: NEPHROLOGY | Facility: CLINIC | Age: 49
End: 2019-10-29

## 2020-08-06 ENCOUNTER — APPOINTMENT (OUTPATIENT)
Dept: URGENT CARE | Facility: CLINIC | Age: 50
End: 2020-08-06
Payer: OTHER MISCELLANEOUS

## 2020-08-06 PROCEDURE — 99283 EMERGENCY DEPT VISIT LOW MDM: CPT | Performed by: NURSE PRACTITIONER

## 2020-08-06 PROCEDURE — G0382 LEV 3 HOSP TYPE B ED VISIT: HCPCS | Performed by: NURSE PRACTITIONER

## 2020-08-09 ENCOUNTER — OCCMED (OUTPATIENT)
Dept: URGENT CARE | Facility: CLINIC | Age: 50
End: 2020-08-09
Payer: OTHER MISCELLANEOUS

## 2020-08-09 DIAGNOSIS — M54.50 BILATERAL LOW BACK PAIN WITHOUT SCIATICA, UNSPECIFIED CHRONICITY: Primary | ICD-10-CM

## 2020-08-09 PROCEDURE — 99213 OFFICE O/P EST LOW 20 MIN: CPT | Performed by: NURSE PRACTITIONER

## 2021-07-27 NOTE — TELEPHONE ENCOUNTER
L/m to schedule pt's hospital follow up      ----- Message from Bernard Case MD sent at 9/25/2019  1:33 PM EDT -----  Regarding: Hospital follow-up  Please schedule the patient for hospital discharge follow-up for hypertension  Please send the patient orders for BMP, magnesium, phosphorus to be done in 1 week    Please try to schedule him with me if possible, he needs to be seen in 2 weeks  Thanks  matti Post-Care Instructions: I reviewed with the patient in detail post-care instructions. Patient should not sweat, pick at, or get the patches wet for 48 hours. Consent: Written consent obtained, risks reviewed including but not limited to rash, itching, allergic reaction, systemic rash, remote possiblity of anaphylaxis to allergen. Number Of Patches (Maximum Allowable Per Dos By Cms Is 90): 30 Detail Level: Zone

## 2023-01-09 ENCOUNTER — APPOINTMENT (OUTPATIENT)
Dept: URGENT CARE | Facility: CLINIC | Age: 53
End: 2023-01-09

## 2023-01-18 ENCOUNTER — HOSPITAL ENCOUNTER (OUTPATIENT)
Dept: RADIOLOGY | Facility: HOSPITAL | Age: 53
Discharge: HOME | End: 2023-01-18
Attending: ORTHOPAEDIC SURGERY

## 2023-01-18 VITALS
BODY MASS INDEX: 30.46 KG/M2 | HEIGHT: 75 IN | WEIGHT: 245 LBS | HEART RATE: 53 BPM | DIASTOLIC BLOOD PRESSURE: 124 MMHG | SYSTOLIC BLOOD PRESSURE: 191 MMHG

## 2023-01-18 DIAGNOSIS — M25.511 RIGHT SHOULDER PAIN, UNSPECIFIED CHRONICITY: ICD-10-CM

## 2023-01-18 DIAGNOSIS — M24.811 INTERNAL DERANGEMENT OF RIGHT SHOULDER: Primary | ICD-10-CM

## 2023-01-18 NOTE — LETTER
January 18, 2023     Flores Goldstein, [address] [place] [address]    Patient: Ramírez Dinero   YOB: 1970   Date of Visit: 1/18/2023       Dear Dr Yosvany Fierro:    Thank you for referring Ramírez Dinero to me for evaluation  Below are my notes for this consultation  If you have questions, please do not hesitate to call me  I look forward to following your patient along with you  Sincerely,        Elisha Stewart        CC: No Recipients  Elisha Stewart  1/18/2023  9:09 AM  Signed  [address] [facility]  [address] [facility] [address]  [phone]       Ramírez Dinero  [id number]  1970    ORTHOPAEDIC SURGERY OUTPATIENT NOTE  1/18/2023      HISTORY:  45 y o  male  Presents for initial evaluation of his right shoulder  He reports at work 10 days ago he was using a heavy wrench and trying to loosen a bolt and felt a pop and tear in the right shoulder  He had a right shoulder scope, SAD, debridement labrum, open biceps tenodesis in 2018 with Dr Jennifer Forrester at [facility]  The operative note said he had grade 2 chondral changes of the glenohumeral joint  States  he was doing well prior to this injury  This is a work comp case  He is RHD  Past Medical History:   Diagnosis Date   • Hypertension        History reviewed  No pertinent surgical history      Social History     Socioeconomic History   • Marital status: /Civil Union     Spouse name: Not on file   • Number of children: Not on file   • Years of education: Not on file   • Highest education level: Not on file   Occupational History   • Not on file   Tobacco Use   • Smoking status: Never   • Smokeless tobacco: Never   Substance and Sexual Activity   • Alcohol use: No   • Drug use: No   • Sexual activity: Not on file   Other Topics Concern   • Not on file   Social History Narrative   • Not on file     Social Determinants of Health     Financial Resource Strain: Not on file   Food Insecurity: Not on file   Transportation Needs: Not on file   Physical Activity: Not on file   Stress: Not on file   Social Connections: Not on file   Intimate Partner Violence: Not on file   Housing Stability: Not on file       History reviewed  No pertinent family history  Patient's Medications   New Prescriptions    No medications on file   Previous Medications    AMLODIPINE (NORVASC) 10 MG TABLET    Take 10 mg by mouth daily    HYDROCHLOROTHIAZIDE (HYDRODIURIL) 25 MG TABLET    Take 25 mg by mouth daily    LIDOCAINE (LIDODERM) 5 %    Apply 1 patch topically daily Remove & Discard patch within 12 hours or as directed by MD    MAGNESIUM OXIDE (MAG-OX) 400 MG    Take 1 tablet by mouth daily for 30 days    NIFEDIPINE (ADALAT CC) 60 MG 24 HR TABLET    Take 1 tablet (60 mg total) by mouth daily    RIBOFLAVIN 400 MG CAPS    Take 1 capsule by mouth daily for 30 days    SPIRONOLACTONE (ALDACTONE) 50 MG TABLET    Take 1 tablet (50 mg total) by mouth 2 (two) times a day    TIZANIDINE (ZANAFLEX) 4 MG TABLET    Take 1 tablet (4 mg total) by mouth every 8 (eight) hours as needed for muscle spasms   Modified Medications    No medications on file   Discontinued Medications    No medications on file       Allergies   Allergen Reactions   • Lisinopril Angioedema        BP (!) 191/124 (BP Location: Left arm, Patient Position: Sitting, Cuff Size: Standard)   Pulse (!) 53   Ht 6' 3" (1 905 m)   Wt 111 kg (245 lb)   BMI 30 62 kg/m²      REVIEW OF SYSTEMS:  Constitutional: Negative  HEENT: Negative  Respiratory: Negative  Skin: Negative  Neurological: Negative  Psychiatric/Behavioral: Negative    Musculoskeletal: Negative except for that mentioned in the HPI     BP (!) 191/124 (BP Location: Left arm, Patient Position: Sitting, Cuff Size: Standard)   Pulse (!) 53   Ht 6' 3" (1 905 m)   Wt 111 kg (245 lb)   BMI 30 62 kg/m²   Gen: No acute distress, resting comfortably in bed  HEENT: Eyes clear, moist mucus membranes, hearing intact  Respiratory: No audible wheezing or stridor  Cardiovascular: Well Perfused peripherally, 2+ distal pulse  Abdomen: nondistended, no peritoneal signs     PHYSICAL EXAM:    RIGHT SHOULDER:    Appearance: well healed incisions    Forward flexion:   80 degrees active, passive 120   Abduction:  80 degrees   External rotation at 0 degrees:   20 degrees   Internal rotation: sacrum   Crepitus with passive ROM    STRENGTH:  Forward flexion:  4/5   Abduction:  5/5   External rotation:  4/5   Internal rotation:  5/5        Speed test: Negative  Yergason's: Negative   Tender to palpation ACJ (acromioclavicular joint): Negative   Tender to palpation LHB (long head of biceps): Negative  Rebollar test: Negative  McGraws test: Negative  Hornblower's: Negative  Lift off: Negative  Belly press: Negative  Bear hug: Negative  External lag sign: Negative  Cross-body adduction: Negative  Sulcus sign: Negative  Snow's test: Negative  Drop arm test: negative    Radial/median/ulnar nerve intact    <2 sec cap refill      IMAGING: X-ray of the right shoulder taken in the office today  This was reviewed by myself and demonstrates moderate degenerative changes of the glenohumeral joint    ASSESSMENT AND PLAN:  45 y o  male with right shoulder injury  He did have previous surgery on the shoulder and was doing well however now has significant weakness and limitations of range of motion  Due to his new injury, weakness on exam, limitations with range of motion, previous surgery we will order an MRI arthrogram to evaluate rotator cuff  He may wear the sling for comfort, range the elbow and pendulum exercises  We will see him back after the MRI  Work note given       Scribe Attestation      I,:  Darryle Heal, PA-C am acting as a scribe while in the presence of the attending physician :       I,:  Mark Weber personally performed the services described in this documentation    as scribed in my presence :

## 2023-01-18 NOTE — LETTER
January 18, 2023     Patient: Clement Mina  YOB: 1970  Date of Visit: 1/18/2023      To Whom it May Concern:    Clement Mina is under my professional care  Allyson Garcia was seen in my office on 1/18/2023  Allyson Garcia is to remain out of work at this time  We will order an MRI and see him back after the MRI to evaluate  If you have any questions or concerns, please don't hesitate to call           Sincerely,          Elisha Stewart        CC: No Recipients

## 2023-01-18 NOTE — PROGRESS NOTES
[address] [facility]  [address] [facility] [address]  [phone]       [facility]  [id number]  1970    ORTHOPAEDIC SURGERY OUTPATIENT NOTE  1/18/2023      HISTORY:  45 y o  male  Presents for initial evaluation of his right shoulder  He reports at work 10 days ago he was using a heavy wrench and trying to loosen a bolt and felt a pop and tear in the right shoulder  He had a right shoulder scope, SAD, debridement labrum, open biceps tenodesis in 2018 with Dr Jayla Nayak at [facility]  The operative note said he had grade 2 chondral changes of the glenohumeral joint  States  he was doing well prior to this injury  This is a work comp case  He is RHD  Past Medical History:   Diagnosis Date   • Hypertension        History reviewed  No pertinent surgical history  Social History     Socioeconomic History   • Marital status: /Civil Union     Spouse name: Not on file   • Number of children: Not on file   • Years of education: Not on file   • Highest education level: Not on file   Occupational History   • Not on file   Tobacco Use   • Smoking status: Never   • Smokeless tobacco: Never   Substance and Sexual Activity   • Alcohol use: No   • Drug use: No   • Sexual activity: Not on file   Other Topics Concern   • Not on file   Social History Narrative   • Not on file     Social Determinants of Health     Financial Resource Strain: Not on file   Food Insecurity: Not on file   Transportation Needs: Not on file   Physical Activity: Not on file   Stress: Not on file   Social Connections: Not on file   Intimate Partner Violence: Not on file   Housing Stability: Not on file       History reviewed  No pertinent family history       Patient's Medications   New Prescriptions    No medications on file   Previous Medications    AMLODIPINE (NORVASC) 10 MG TABLET    Take 10 mg by mouth daily    HYDROCHLOROTHIAZIDE (HYDRODIURIL) 25 MG TABLET    Take 25 mg by mouth daily    LIDOCAINE (LIDODERM) 5 %    Apply 1 patch topically daily Remove & Discard patch within 12 hours or as directed by MD    MAGNESIUM OXIDE (MAG-OX) 400 MG    Take 1 tablet by mouth daily for 30 days    NIFEDIPINE (ADALAT CC) 60 MG 24 HR TABLET    Take 1 tablet (60 mg total) by mouth daily    RIBOFLAVIN 400 MG CAPS    Take 1 capsule by mouth daily for 30 days    SPIRONOLACTONE (ALDACTONE) 50 MG TABLET    Take 1 tablet (50 mg total) by mouth 2 (two) times a day    TIZANIDINE (ZANAFLEX) 4 MG TABLET    Take 1 tablet (4 mg total) by mouth every 8 (eight) hours as needed for muscle spasms   Modified Medications    No medications on file   Discontinued Medications    No medications on file       Allergies   Allergen Reactions   • Lisinopril Angioedema        BP (!) 191/124 (BP Location: Left arm, Patient Position: Sitting, Cuff Size: Standard)   Pulse (!) 53   Ht 6' 3" (1 905 m)   Wt 111 kg (245 lb)   BMI 30 62 kg/m²      REVIEW OF SYSTEMS:  Constitutional: Negative  HEENT: Negative  Respiratory: Negative  Skin: Negative  Neurological: Negative  Psychiatric/Behavioral: Negative    Musculoskeletal: Negative except for that mentioned in the HPI     BP (!) 191/124 (BP Location: Left arm, Patient Position: Sitting, Cuff Size: Standard)   Pulse (!) 53   Ht 6' 3" (1 905 m)   Wt 111 kg (245 lb)   BMI 30 62 kg/m²   Gen: No acute distress, resting comfortably in bed  HEENT: Eyes clear, moist mucus membranes, hearing intact  Respiratory: No audible wheezing or stridor  Cardiovascular: Well Perfused peripherally, 2+ distal pulse  Abdomen: nondistended, no peritoneal signs     PHYSICAL EXAM:    RIGHT SHOULDER:    Appearance: well healed incisions    Forward flexion:   80 degrees active, passive 120   Abduction:  80 degrees   External rotation at 0 degrees:   20 degrees   Internal rotation: sacrum   Crepitus with passive ROM    STRENGTH:  Forward flexion:  4/5   Abduction:  5/5   External rotation: 4/5   Internal rotation:  5/5        Speed test: Negative  Yergason's: Negative   Tender to palpation ACJ (acromioclavicular joint): Negative   Tender to palpation LHB (long head of biceps): Negative  Rebollar test: Negative  Algonac test: Negative  Hornblower's: Negative  Lift off: Negative  Belly press: Negative  Bear hug: Negative  External lag sign: Negative  Cross-body adduction: Negative  Sulcus sign: Negative  Snow's test: Negative  Drop arm test: negative    Radial/median/ulnar nerve intact    <2 sec cap refill      IMAGING: X-ray of the right shoulder taken in the office today  This was reviewed by myself and demonstrates moderate degenerative changes of the glenohumeral joint    ASSESSMENT AND PLAN:  45 y o  male with right shoulder injury  He did have previous surgery on the shoulder and was doing well however now has significant weakness and limitations of range of motion  Due to his new injury, weakness on exam, limitations with range of motion, previous surgery we will order an MRI arthrogram to evaluate rotator cuff  He may wear the sling for comfort, range the elbow and pendulum exercises  We will see him back after the MRI  Work note given       Scribe Attestation      I,:  Russell Cantu PA-C am acting as a scribe while in the presence of the attending physician :       I,:  Ángel Urena personally performed the services described in this documentation    as scribed in my presence :

## 2023-02-02 ENCOUNTER — TELEPHONE (OUTPATIENT)
Dept: RADIOLOGY | Facility: HOSPITAL | Age: 53
End: 2023-02-02

## 2023-02-06 ENCOUNTER — TELEPHONE (OUTPATIENT)
Dept: RADIOLOGY | Facility: HOSPITAL | Age: 53
End: 2023-02-06

## 2023-02-08 ENCOUNTER — HOSPITAL ENCOUNTER (OUTPATIENT)
Dept: RADIOLOGY | Facility: HOSPITAL | Age: 53
Discharge: HOME | End: 2023-02-08